# Patient Record
Sex: FEMALE | Race: WHITE | NOT HISPANIC OR LATINO | URBAN - METROPOLITAN AREA
[De-identification: names, ages, dates, MRNs, and addresses within clinical notes are randomized per-mention and may not be internally consistent; named-entity substitution may affect disease eponyms.]

---

## 2018-01-08 ENCOUNTER — OFFICE VISIT (OUTPATIENT)
Dept: OCCUPATIONAL MEDICINE | Age: 48
End: 2018-01-08

## 2018-01-08 DIAGNOSIS — Z00.8 HEALTH EXAMINATION IN POPULATION SURVEY: Primary | ICD-10-CM

## 2018-01-08 PROCEDURE — OH035 DOT/N-DOT DS COLLECTION ONLY (ALL TYPES)

## 2020-05-19 ENCOUNTER — OFFICE VISIT (OUTPATIENT)
Dept: INTERNAL MEDICINE | Facility: CLINIC | Age: 50
End: 2020-05-19
Payer: COMMERCIAL

## 2020-05-19 ENCOUNTER — HOSPITAL ENCOUNTER (OUTPATIENT)
Dept: RADIOLOGY | Facility: HOSPITAL | Age: 50
Discharge: HOME | End: 2020-05-19
Attending: FAMILY MEDICINE
Payer: COMMERCIAL

## 2020-05-19 VITALS
HEIGHT: 67 IN | SYSTOLIC BLOOD PRESSURE: 138 MMHG | BODY MASS INDEX: 25.71 KG/M2 | WEIGHT: 163.81 LBS | OXYGEN SATURATION: 97 % | TEMPERATURE: 96 F | RESPIRATION RATE: 16 BRPM | HEART RATE: 72 BPM | DIASTOLIC BLOOD PRESSURE: 72 MMHG

## 2020-05-19 DIAGNOSIS — I10 ESSENTIAL HYPERTENSION: ICD-10-CM

## 2020-05-19 DIAGNOSIS — Z11.4 ENCOUNTER FOR SCREENING FOR HIV: ICD-10-CM

## 2020-05-19 DIAGNOSIS — R07.89 CHEST DISCOMFORT: ICD-10-CM

## 2020-05-19 DIAGNOSIS — Z12.39 BREAST CANCER SCREENING: ICD-10-CM

## 2020-05-19 DIAGNOSIS — G56.03 CARPAL TUNNEL SYNDROME ON BOTH SIDES: Primary | ICD-10-CM

## 2020-05-19 DIAGNOSIS — G44.209 TENSION HEADACHE: ICD-10-CM

## 2020-05-19 PROCEDURE — 93005 ELECTROCARDIOGRAM TRACING: CPT | Mod: PO

## 2020-05-19 PROCEDURE — 3075F PR MOST RECENT SYSTOLIC BLOOD PRESS GE 130-139MM HG: ICD-10-PCS | Mod: CPTII,S$GLB,, | Performed by: FAMILY MEDICINE

## 2020-05-19 PROCEDURE — 77067 SCR MAMMO BI INCL CAD: CPT | Mod: 26,,, | Performed by: RADIOLOGY

## 2020-05-19 PROCEDURE — 77067 SCR MAMMO BI INCL CAD: CPT | Mod: TC,PO

## 2020-05-19 PROCEDURE — 77063 BREAST TOMOSYNTHESIS BI: CPT | Mod: 26,,, | Performed by: RADIOLOGY

## 2020-05-19 PROCEDURE — 3078F DIAST BP <80 MM HG: CPT | Mod: CPTII,S$GLB,, | Performed by: FAMILY MEDICINE

## 2020-05-19 PROCEDURE — 99204 PR OFFICE/OUTPT VISIT, NEW, LEVL IV, 45-59 MIN: ICD-10-PCS | Mod: S$GLB,,, | Performed by: FAMILY MEDICINE

## 2020-05-19 PROCEDURE — 99999 PR PBB SHADOW E&M-EST. PATIENT-LVL IV: ICD-10-PCS | Mod: PBBFAC,,, | Performed by: FAMILY MEDICINE

## 2020-05-19 PROCEDURE — 3008F PR BODY MASS INDEX (BMI) DOCUMENTED: ICD-10-PCS | Mod: CPTII,S$GLB,, | Performed by: FAMILY MEDICINE

## 2020-05-19 PROCEDURE — 93010 EKG 12-LEAD: ICD-10-PCS | Mod: S$GLB,,, | Performed by: INTERNAL MEDICINE

## 2020-05-19 PROCEDURE — 3075F SYST BP GE 130 - 139MM HG: CPT | Mod: CPTII,S$GLB,, | Performed by: FAMILY MEDICINE

## 2020-05-19 PROCEDURE — 93010 ELECTROCARDIOGRAM REPORT: CPT | Mod: S$GLB,,, | Performed by: INTERNAL MEDICINE

## 2020-05-19 PROCEDURE — 99999 PR PBB SHADOW E&M-EST. PATIENT-LVL IV: CPT | Mod: PBBFAC,,, | Performed by: FAMILY MEDICINE

## 2020-05-19 PROCEDURE — 77067 MAMMO DIGITAL SCREENING BILAT WITH TOMOSYNTHESIS_CAD: ICD-10-PCS | Mod: 26,,, | Performed by: RADIOLOGY

## 2020-05-19 PROCEDURE — 99204 OFFICE O/P NEW MOD 45 MIN: CPT | Mod: S$GLB,,, | Performed by: FAMILY MEDICINE

## 2020-05-19 PROCEDURE — 3008F BODY MASS INDEX DOCD: CPT | Mod: CPTII,S$GLB,, | Performed by: FAMILY MEDICINE

## 2020-05-19 PROCEDURE — 3078F PR MOST RECENT DIASTOLIC BLOOD PRESSURE < 80 MM HG: ICD-10-PCS | Mod: CPTII,S$GLB,, | Performed by: FAMILY MEDICINE

## 2020-05-19 PROCEDURE — 77063 MAMMO DIGITAL SCREENING BILAT WITH TOMOSYNTHESIS_CAD: ICD-10-PCS | Mod: 26,,, | Performed by: RADIOLOGY

## 2020-05-19 RX ORDER — LOSARTAN POTASSIUM 50 MG/1
50 TABLET ORAL DAILY
COMMUNITY
Start: 2020-04-24 | End: 2020-05-19 | Stop reason: DRUGHIGH

## 2020-05-19 RX ORDER — HYDROCHLOROTHIAZIDE 12.5 MG/1
12.5 TABLET ORAL DAILY
COMMUNITY
Start: 2020-04-24 | End: 2020-06-04 | Stop reason: DRUGHIGH

## 2020-05-19 RX ORDER — LOSARTAN POTASSIUM 100 MG/1
100 TABLET ORAL DAILY
Qty: 90 TABLET | Refills: 3 | Status: SHIPPED | OUTPATIENT
Start: 2020-05-19 | End: 2020-11-18 | Stop reason: SDUPTHER

## 2020-05-19 NOTE — PROGRESS NOTES
Patient ID: Malinda Ruiz is a 50 y.o. female.    Chief Complaint: Establish Care    HPI Patient is a 50-year-old female who presents to establish [facility].  Also reports chest pain described as sharp midsternal.  Occurs with exertion and rest only intermittent a few days a week.  No associated nausea or vomiting, no radiation to jaw or arm.  Last 3-10 minutes, usually self-resolve. Feels that the symptoms are not associated with any anxiety or stress.  Per chart check she was seen a month ago at another hospital, had lab work performed and was started on losartan and hydrochlorothiazide. Blood pressures still remain elevated 145-150/90s.     Per care everywhere from 4/28/2020:  tsh 0.78   hba1c 5.1.     Also reports history of carpal tunnel-B/L during pregnancy years ago. She lost weight then symptoms improved.  She does a lot heavy lifting at [employer] and feels like this may have caused her symptoms to flare up.  Symptoms worse at right hand. Does wear wrist splints with no relief. Reports numnbness at tips of fingers and reduced  strength.    Also reports headahces, intermittent in nature located frontal region of head and back of neck. Feels like tight band around head. No associated n/v. Sometimes light and noise aggravate headache.     History of tobacco use from [age] yrs old. 1 pk per week.  Drinks wine once or twice per week      Family History   Problem Relation Age of Onset    Hypertension Sister     Birth defects Maternal Grandfather     Heart attack Paternal Grandmother     Heart attack Paternal Grandfather     Breast cancer Maternal Aunt        Current Outpatient Medications:     hydroCHLOROthiazide (HYDRODIURIL) 12.5 MG Tab, Take 12.5 mg by mouth once daily., Disp: , Rfl:     losartan (COZAAR) 100 MG tablet, Take 1 tablet (100 mg total) by mouth once daily., Disp: 90 tablet, Rfl: 3    Review of Systems   Constitutional: Negative for chills and fever.   HENT: Negative for voice  "change.    Eyes: Negative for visual disturbance.   Respiratory: Negative for cough and shortness of breath.    Cardiovascular: Positive for chest pain and palpitations.   Gastrointestinal: Negative for abdominal pain.   Endocrine: Negative for polydipsia.   Genitourinary: Negative for pelvic pain.   Musculoskeletal: Positive for neck pain. Negative for back pain.   Skin: Negative for wound.   Neurological: Positive for headaches.   Psychiatric/Behavioral: The patient is not nervous/anxious.        Objective:   /72 (BP Location: Left arm, Patient Position: Sitting, BP Method: Medium (Automatic))   Pulse 72   Temp 96.3 °F (35.7 °C) (Oral)   Resp 16   Ht 5' 7.25" (1.708 m)   Wt 74.3 kg (163 lb 12.8 oz)   LMP 05/19/2020   SpO2 97%   BMI 25.46 kg/m²      Physical Exam   Constitutional: She is oriented to person, place, and time. She appears well-developed and well-nourished. No distress.   HENT:   Head: Normocephalic and atraumatic.   Eyes: Conjunctivae are normal.   Neck: Normal range of motion.   Posterior neck tense musculature   Cardiovascular: Normal rate, regular rhythm, normal heart sounds and intact distal pulses.   Pulmonary/Chest: Effort normal and breath sounds normal.   Abdominal: Soft.   Musculoskeletal: Normal range of motion.   Neurological: She is alert and oriented to person, place, and time. A sensory deficit (3rd 4th fingertips right hand) is present. No cranial nerve deficit.   Hand  strength 5/5 B/L   Skin: Skin is warm. She is not diaphoretic.   Psychiatric: She has a normal mood and affect. Her behavior is normal.       Assessment & Plan     Problem List Items Addressed This Visit        Neuro    Carpal tunnel syndrome on both sides - Primary    Relevant Orders    Nerve conduction test    Tension headache    Current Assessment & Plan     -headache appears to be more likely tension related. otc pain meds prn for now            Cardiac/Vascular    Essential hypertension    Overview "     -continue hctz 12.5 mg. Increased losartan to 100 mg         Relevant Medications    losartan (COZAAR) 100 MG tablet    Other Relevant Orders    Lipid Panel    Comprehensive metabolic panel       Renal/    Breast cancer screening    Current Assessment & Plan     -MMG ordered. Last mmg a few years ago. Reports no abnormal mmgs            ID    Encounter for screening for HIV    Relevant Orders    HIV 1/2 Ag/Ab (4th Gen)       Other    Chest discomfort    Current Assessment & Plan     -risk stratify with lipid panel. Recent hba1c normal         Relevant Orders    IN OFFICE EKG 12-LEAD (to [place])            Follow up if symptoms worsen or fail to improve.      Disclaimer:  This note may have been prepared using voice recognition software, without extensive proofreading. As such, there could be errors within the text such as sound alike errors.

## 2020-06-04 DIAGNOSIS — I10 ESSENTIAL HYPERTENSION: Primary | ICD-10-CM

## 2020-06-04 RX ORDER — HYDROCHLOROTHIAZIDE 25 MG/1
25 TABLET ORAL DAILY
Qty: 30 TABLET | Refills: 3 | Status: SHIPPED | OUTPATIENT
Start: 2020-06-04 | End: 2020-09-03 | Stop reason: SDUPTHER

## 2020-06-17 DIAGNOSIS — Z13.79 GENETIC SCREENING: ICD-10-CM

## 2020-08-17 ENCOUNTER — OFFICE VISIT (OUTPATIENT)
Dept: INTERNAL MEDICINE | Facility: CLINIC | Age: 50
End: 2020-08-17
Payer: COMMERCIAL

## 2020-08-17 ENCOUNTER — HOSPITAL ENCOUNTER (OUTPATIENT)
Dept: RADIOLOGY | Facility: HOSPITAL | Age: 50
Discharge: HOME | End: 2020-08-17
Attending: FAMILY MEDICINE
Payer: COMMERCIAL

## 2020-08-17 VITALS
OXYGEN SATURATION: 98 % | HEART RATE: 59 BPM | SYSTOLIC BLOOD PRESSURE: 150 MMHG | WEIGHT: 173.94 LBS | RESPIRATION RATE: 18 BRPM | TEMPERATURE: 99 F | DIASTOLIC BLOOD PRESSURE: 88 MMHG | HEIGHT: 67 IN | BODY MASS INDEX: 27.3 KG/M2

## 2020-08-17 DIAGNOSIS — M25.552 PAIN OF LEFT HIP JOINT: ICD-10-CM

## 2020-08-17 DIAGNOSIS — G56.03 CARPAL TUNNEL SYNDROME ON BOTH SIDES: ICD-10-CM

## 2020-08-17 DIAGNOSIS — I10 ESSENTIAL HYPERTENSION: Primary | ICD-10-CM

## 2020-08-17 DIAGNOSIS — R07.89 CHEST DISCOMFORT: ICD-10-CM

## 2020-08-17 PROCEDURE — 3008F BODY MASS INDEX DOCD: CPT | Mod: CPTII,S$GLB,, | Performed by: FAMILY MEDICINE

## 2020-08-17 PROCEDURE — 99999 PR PBB SHADOW E&M-EST. PATIENT-LVL IV: ICD-10-PCS | Mod: PBBFAC,,, | Performed by: FAMILY MEDICINE

## 2020-08-17 PROCEDURE — 3079F DIAST BP 80-89 MM HG: CPT | Mod: CPTII,S$GLB,, | Performed by: FAMILY MEDICINE

## 2020-08-17 PROCEDURE — 3079F PR MOST RECENT DIASTOLIC BLOOD PRESSURE 80-89 MM HG: ICD-10-PCS | Mod: CPTII,S$GLB,, | Performed by: FAMILY MEDICINE

## 2020-08-17 PROCEDURE — 99214 OFFICE O/P EST MOD 30 MIN: CPT | Mod: S$GLB,,, | Performed by: FAMILY MEDICINE

## 2020-08-17 PROCEDURE — 3008F PR BODY MASS INDEX (BMI) DOCUMENTED: ICD-10-PCS | Mod: CPTII,S$GLB,, | Performed by: FAMILY MEDICINE

## 2020-08-17 PROCEDURE — 3077F PR MOST RECENT SYSTOLIC BLOOD PRESSURE >= 140 MM HG: ICD-10-PCS | Mod: CPTII,S$GLB,, | Performed by: FAMILY MEDICINE

## 2020-08-17 PROCEDURE — 73502 XR HIP 2 VIEW LEFT: ICD-10-PCS | Mod: 26,LT,, | Performed by: RADIOLOGY

## 2020-08-17 PROCEDURE — 99214 PR OFFICE/OUTPT VISIT, EST, LEVL IV, 30-39 MIN: ICD-10-PCS | Mod: S$GLB,,, | Performed by: FAMILY MEDICINE

## 2020-08-17 PROCEDURE — 99999 PR PBB SHADOW E&M-EST. PATIENT-LVL IV: CPT | Mod: PBBFAC,,, | Performed by: FAMILY MEDICINE

## 2020-08-17 PROCEDURE — 3077F SYST BP >= 140 MM HG: CPT | Mod: CPTII,S$GLB,, | Performed by: FAMILY MEDICINE

## 2020-08-17 PROCEDURE — 73502 X-RAY EXAM HIP UNI 2-3 VIEWS: CPT | Mod: 26,LT,, | Performed by: RADIOLOGY

## 2020-08-17 PROCEDURE — 73502 X-RAY EXAM HIP UNI 2-3 VIEWS: CPT | Mod: TC,PO,LT

## 2020-08-17 RX ORDER — NAPROXEN SODIUM 220 MG
220 TABLET ORAL EVERY OTHER DAY
COMMUNITY

## 2020-08-17 RX ORDER — AMLODIPINE BESYLATE 5 MG/1
5 TABLET ORAL DAILY
Qty: 30 TABLET | Refills: 9 | Status: SHIPPED | OUTPATIENT
Start: 2020-08-17 | End: 2020-09-15 | Stop reason: SDUPTHER

## 2020-08-17 NOTE — PROGRESS NOTES
Patient ID: Malinda Ruiz is a 50 y.o. female.    Chief Complaint: Carpal tunnel pain, Hip Pain, and High BP past few days    HPI 51 yo F with elevated home bps (140-150s/90s).  She reports compliance with hydrochlorothiazide and losartan.  Also says that carpal tunnel syndrome has been worse. Left hand pain worse.  Wears wrist splints at night however sometimes splints help sometimes they do not.  Reports numbness of b/l fingers.  Also bilateral hand weakness.  At her job she works with her hands a lot.  Had this complaint at last appointment.  Nerve conduction study test ordered but never performed.      Still reports occasional chest discomfort (nost recently 2 weeks ago that lasted 30 minutes).  At last appointment EKG was normal. Will refer to cardiology.  Dad  of MI at 71 yo.    Also reports constant left hip pain that began over 2 months ago.  No known inciting event.  Associated symptoms include stiffness and difficulty walking 2/2 pain.  No swelling or erythema.  No aggravating or alleviating factors. For hip and carpal tunnel pain she tries to avoid nsaids to avoid elevating blood pressure.    Family History   Problem Relation Age of Onset    Hypertension Sister     Birth defects Maternal Grandfather     Heart attack Paternal Grandmother     Heart attack Paternal Grandfather     Breast cancer Maternal Aunt        Current Outpatient Medications:     hydroCHLOROthiazide (HYDRODIURIL) 25 MG tablet, Take 1 tablet (25 mg total) by mouth once daily., Disp: 30 tablet, Rfl: 3    losartan (COZAAR) 100 MG tablet, Take 1 tablet (100 mg total) by mouth once daily., Disp: 90 tablet, Rfl: 3    naproxen sodium (ALEVE) 220 MG tablet, Take 220 mg by mouth every other day., Disp: , Rfl:     amLODIPine (NORVASC) 5 MG tablet, Take 1 tablet (5 mg total) by mouth once daily., Disp: 30 tablet, Rfl: 9    Review of Systems   Constitutional: Negative for chills and fever.   HENT: Negative for congestion  "and sore throat.    Eyes: Negative for visual disturbance.   Respiratory: Negative for cough, shortness of breath and wheezing.    Cardiovascular: Negative for chest pain.   Gastrointestinal: Negative for abdominal pain, diarrhea and nausea.   Endocrine: Negative for polydipsia and polyuria.   Musculoskeletal: Positive for arthralgias.        Hip, hands   Skin: Negative for rash.   Neurological: Positive for weakness and numbness. Negative for dizziness and headaches.   Psychiatric/Behavioral: Negative for sleep disturbance. The patient is not nervous/anxious.        Objective:   BP (!) 150/88 (BP Location: Left arm, Patient Position: Sitting, BP Method: Large (Manual))   Pulse (!) 59   Temp 99 °F (37.2 °C) (Temporal)   Resp 18   Ht 5' 7.25" (1.708 m)   Wt 78.9 kg (173 lb 15.1 oz)   LMP 05/26/2020   SpO2 98%   BMI 27.04 kg/m²      Physical Exam  Constitutional:       Appearance: She is well-developed.   HENT:      Head: Normocephalic and atraumatic.   Eyes:      Conjunctiva/sclera: Conjunctivae normal.   Neck:      Musculoskeletal: Normal range of motion.   Cardiovascular:      Rate and Rhythm: Normal rate and regular rhythm.      Heart sounds: Normal heart sounds.   Pulmonary:      Effort: Pulmonary effort is normal.      Breath sounds: Normal breath sounds.   Abdominal:      Palpations: Abdomen is soft.   Musculoskeletal: Normal range of motion.         General: Tenderness (left hip worse with internal rotation) present. No swelling, deformity or signs of injury.   Skin:     General: Skin is warm.   Neurological:      General: No focal deficit present.      Mental Status: She is alert and oriented to person, place, and time.      Comments: Normal b/l hand . negative phalen test   Psychiatric:         Behavior: Behavior normal.         Assessment & Plan     Problem List Items Addressed This Visit        Neuro    Carpal tunnel syndrome on both sides    Current Assessment & Plan     -schedule nerve " conduction test            Cardiac/Vascular    Essential hypertension - Primary    Current Assessment & Plan     -continue hctz 25 mg and Losartan 100 mg. Start amlodipine 5 mg  -will also refer to cards for eval of intermittent chest discomfort and further management of HTN         Relevant Medications    amLODIPine (NORVASC) 5 MG tablet    Other Relevant Orders    Ambulatory referral/consult to Cardiology      Other Visit Diagnoses     Pain of left hip joint        Relevant Orders    X-Ray Hip 2 View Left (Completed)            Follow up in about 2 weeks (around 8/31/2020) for Recheck blood pressure.      Disclaimer:  This note may have been prepared using voice recognition software, without extensive proofreading. As such, there could be errors within the text such as sound alike errors.

## 2020-08-17 NOTE — ASSESSMENT & PLAN NOTE
-continue hctz 25 mg and Losartan 100 mg. Start amlodipine 5 mg  -will also refer to cards for eval of intermittent chest discomfort and further management of HTN

## 2020-08-18 ENCOUNTER — TELEPHONE (OUTPATIENT)
Dept: ORTHOPEDICS | Facility: CLINIC | Age: 50
End: 2020-08-18

## 2020-08-18 ENCOUNTER — TELEPHONE (OUTPATIENT)
Dept: INTERNAL MEDICINE | Facility: CLINIC | Age: 50
End: 2020-08-18

## 2020-08-18 DIAGNOSIS — M16.12 ARTHRITIS OF LEFT HIP: Primary | ICD-10-CM

## 2020-08-18 NOTE — TELEPHONE ENCOUNTER
----- Message from Ramona Hurtado MD sent at 8/18/2020  7:43 AM CDT -----  Please notify patient that left hip xray showed bilateral hip degenerative findings.  Also showed degenerative changes of the lower lumbar spine. Would like to refer to ortho for further management since pain has been persistent, if she is agreeable

## 2020-08-21 ENCOUNTER — LAB VISIT (OUTPATIENT)
Dept: LAB | Facility: HOSPITAL | Age: 50
End: 2020-08-21
Attending: INTERNAL MEDICINE
Payer: COMMERCIAL

## 2020-08-21 ENCOUNTER — OFFICE VISIT (OUTPATIENT)
Dept: CARDIOLOGY | Facility: CLINIC | Age: 50
End: 2020-08-21
Payer: COMMERCIAL

## 2020-08-21 VITALS
WEIGHT: 170 LBS | DIASTOLIC BLOOD PRESSURE: 90 MMHG | SYSTOLIC BLOOD PRESSURE: 148 MMHG | BODY MASS INDEX: 26.42 KG/M2 | HEART RATE: 56 BPM | OXYGEN SATURATION: 98 %

## 2020-08-21 DIAGNOSIS — I10 ESSENTIAL HYPERTENSION: ICD-10-CM

## 2020-08-21 DIAGNOSIS — R07.89 OTHER CHEST PAIN: Primary | ICD-10-CM

## 2020-08-21 DIAGNOSIS — R06.02 SOB (SHORTNESS OF BREATH): ICD-10-CM

## 2020-08-21 DIAGNOSIS — R06.83 SNORES: ICD-10-CM

## 2020-08-21 PROCEDURE — 99999 PR PBB SHADOW E&M-EST. PATIENT-LVL IV: ICD-10-PCS | Mod: PBBFAC,,, | Performed by: INTERNAL MEDICINE

## 2020-08-21 PROCEDURE — 36415 COLL VENOUS BLD VENIPUNCTURE: CPT

## 2020-08-21 PROCEDURE — 82088 ASSAY OF ALDOSTERONE: CPT

## 2020-08-21 PROCEDURE — 99999 PR PBB SHADOW E&M-EST. PATIENT-LVL IV: CPT | Mod: PBBFAC,,, | Performed by: INTERNAL MEDICINE

## 2020-08-21 PROCEDURE — 99245 OFF/OP CONSLTJ NEW/EST HI 55: CPT | Mod: S$GLB,,, | Performed by: INTERNAL MEDICINE

## 2020-08-21 PROCEDURE — 83835 ASSAY OF METANEPHRINES: CPT

## 2020-08-21 PROCEDURE — 99245 PR OFFICE CONSULTATION,LEVEL V: ICD-10-PCS | Mod: S$GLB,,, | Performed by: INTERNAL MEDICINE

## 2020-08-21 NOTE — PROGRESS NOTES
Subjective:   Patient ID:  Malinda Ruiz is a 50 y.o. female who presents for evaluation of Dizziness and Chest Pain      49yo female, referred for HTN and chest pain  PMH HTN  Started Losartan and HCXT few months ago and added Amlodipine 3 days ago.  Pt is faithfully taking her med. BP at 140 to 150 mmHG  C/o chest pain for 2 weeks, dyspnea after wearing the mask, dizziness, faint,  Some hand shaking,   Dizziness recently.  No vision change  Walking only due to knee injury  Lifting work at [employer]  No smoking  Occasional drinking  Father had heart issue at 72. Mother viral infection  EKG  NSR      Past Medical History:   Diagnosis Date    Hypertension        Past Surgical History:   Procedure Laterality Date     SECTION      KNEE SURGERY Bilateral        Social History     Tobacco Use    Smoking status: Never Smoker    Smokeless tobacco: Never Used   Substance Use Topics    Alcohol use: Yes     Frequency: 2-3 times a week     Drinks per session: 1 or 2     Binge frequency: Never    Drug use: Not on file       Family History   Problem Relation Age of Onset    Hypertension Sister     Birth defects Maternal Grandfather     Heart attack Paternal Grandmother     Heart attack Paternal Grandfather     Breast cancer Maternal Aunt        Review of Systems   Constitution: Negative for decreased appetite, diaphoresis, fever, malaise/fatigue and night sweats.   HENT: Negative for nosebleeds.    Eyes: Negative for blurred vision and double vision.   Cardiovascular: Positive for chest pain and dyspnea on exertion. Negative for claudication, irregular heartbeat, leg swelling, near-syncope, orthopnea, palpitations, paroxysmal nocturnal dyspnea and syncope.   Respiratory: Negative for cough, shortness of breath, sleep disturbances due to breathing, snoring, sputum production and wheezing.    Endocrine: Negative for cold intolerance and polyuria.   Hematologic/Lymphatic: Does not  bruise/bleed easily.   Skin: Negative for rash.   Musculoskeletal: Negative for back pain, falls, joint pain, joint swelling and neck pain.   Gastrointestinal: Negative for abdominal pain, heartburn, nausea and vomiting.   Genitourinary: Negative for dysuria, frequency and hematuria.   Neurological: Positive for dizziness and light-headedness. Negative for difficulty with concentration, focal weakness, headaches, numbness, seizures and weakness.   Psychiatric/Behavioral: Negative for depression, memory loss and substance abuse. The patient does not have insomnia.    Allergic/Immunologic: Negative for HIV exposure and hives.       Objective:   Physical Exam   Constitutional: She is oriented to person, place, and time. She appears well-nourished.   HENT:   Head: Normocephalic.   Eyes: Pupils are equal, round, and reactive to light.   Neck: Normal carotid pulses and no JVD present. Carotid bruit is not present. No thyromegaly present.   Cardiovascular: Normal rate, regular rhythm, normal heart sounds and normal pulses.  No extrasystoles are present. PMI is not displaced. Exam reveals no gallop and no S3.   No murmur heard.  Pulmonary/Chest: Breath sounds normal. No stridor. No respiratory distress.   Abdominal: Soft. Bowel sounds are normal. There is no abdominal tenderness. There is no rebound.   Musculoskeletal: Normal range of motion.   Neurological: She is alert and oriented to person, place, and time.   Skin: Skin is intact. No rash noted.   Psychiatric: Her behavior is normal.       Lab Results   Component Value Date    CHOL 180 05/19/2020     Lab Results   Component Value Date    HDL 81 (H) 05/19/2020     Lab Results   Component Value Date    LDLCALC 80.2 05/19/2020     Lab Results   Component Value Date    TRIG 94 05/19/2020     Lab Results   Component Value Date    CHOLHDL 45.0 05/19/2020       Chemistry        Component Value Date/Time     05/19/2020 1447    K 3.6 05/19/2020 1447     05/19/2020  1447    CO2 27 05/19/2020 1447    BUN 19 05/19/2020 1447    CREATININE 0.8 05/19/2020 1447     05/19/2020 1447        Component Value Date/Time    CALCIUM 9.2 05/19/2020 1447    ALKPHOS 52 (L) 05/19/2020 1447    AST 15 05/19/2020 1447    ALT 16 05/19/2020 1447    BILITOT 0.6 05/19/2020 1447    ESTGFRAFRICA >60.0 05/19/2020 1447    EGFRNONAA >60.0 05/19/2020 1447          No results found for: LABA1C, HGBA1C  No results found for: TSH  No results found for: INR, PROTIME  No results found for: WBC, HGB, HCT, MCV, PLT  BNP  @LABRCNTIP(BNP,BNPTRIAGEBLO)@  CrCl cannot be calculated (Patient's most recent lab result is older than the maximum 7 days allowed.).  No results found in the last 24 hours.  No results found in the last 24 hours.  No results found in the last 24 hours.    Assessment:      1. Other chest pain    2. Essential hypertension    3. SOB (shortness of breath)    4. Snores        Plan:   Continue Losartan  HCTZ and amlodipine   Check BP   Echo and ETT for CP  Renin/metaepheirne check  Refer to sleep study  DM Rx per PCP  Counseled DASH  Check Lipid profile in 6 months  Recommend heart-healthy diet, weight control and regular exercise.  Brenda. Risk modification.   F/u with pcp    I have reviewed all pertinent labs and cardiac studies independently. Plans and recommendations have been formulated under my direct supervision. All questions answered and patient voiced understanding.   If symptoms persist go to the ED

## 2020-08-21 NOTE — LETTER
August 22, 2020      Ramona Hurtado MD  [address] [address] [address]           [facility]  [address] [address]  Phone: [phone]  Fax: [phone]          Patient: Malinda Ruiz   MR Number: [id number]   YOB: 1970   Date of Visit: 8/21/2020       Dear Dr. Ramona Hurtado:    Thank you for referring Malinda Ruiz to me for evaluation. Attached you will find relevant portions of my assessment and plan of care.    If you have questions, please do not hesitate to call me. I look forward to following Malinda Ruiz along with you.    Sincerely,    Troy Velasquez MD    Enclosure  CC:  No Recipients    If you would like to receive this communication electronically, please contact [email or web address] or [phone] to request more information on [employer] access.    For providers and/or their staff who would like to refer a patient to [facility], please contact us through our one-stop-shop provider referral line, [facility] at [phone].    If you feel you have received this communication in error or would no longer like to receive these types of communications, please e-mail [email or web address]

## 2020-08-25 LAB
ALDOST SERPL-MCNC: 8 NG/DL
ALDOST/RENIN PLAS-RTO: 16 RATIO
RENIN PLAS-CCNC: 0.5 NG/ML/HR

## 2020-08-27 LAB
METANEPH FREE SERPL-MCNC: <25 PG/ML
METANEPHS SERPL-MCNC: 51 PG/ML
NORMETANEPH FREE SERPL-MCNC: 51 PG/ML

## 2020-08-28 ENCOUNTER — TELEPHONE (OUTPATIENT)
Dept: RADIOLOGY | Facility: HOSPITAL | Age: 50
End: 2020-08-28

## 2020-08-28 ENCOUNTER — TELEPHONE (OUTPATIENT)
Dept: CARDIOLOGY | Facility: CLINIC | Age: 50
End: 2020-08-28

## 2020-08-28 NOTE — TELEPHONE ENCOUNTER
Pt contacted about results, pt verbalized understanding          ----- Message from Troy Velasquez MD sent at 8/28/2020  2:55 PM CDT -----  Lab showed no 2nd HTN

## 2020-08-31 ENCOUNTER — HOSPITAL ENCOUNTER (OUTPATIENT)
Dept: RADIOLOGY | Facility: HOSPITAL | Age: 50
Discharge: HOME | End: 2020-08-31
Attending: INTERNAL MEDICINE
Payer: COMMERCIAL

## 2020-08-31 DIAGNOSIS — I10 ESSENTIAL HYPERTENSION: ICD-10-CM

## 2020-08-31 PROCEDURE — 93975 US RENAL ARTERY STENOSIS HYPERTEN (XPD): ICD-10-PCS | Mod: 26,,, | Performed by: RADIOLOGY

## 2020-08-31 PROCEDURE — 93975 VASCULAR STUDY: CPT | Mod: 26,,, | Performed by: RADIOLOGY

## 2020-08-31 PROCEDURE — 93975 VASCULAR STUDY: CPT | Mod: TC,PO

## 2020-09-08 ENCOUNTER — TELEPHONE (OUTPATIENT)
Dept: CARDIOLOGY | Facility: CLINIC | Age: 50
End: 2020-09-08

## 2020-09-08 NOTE — TELEPHONE ENCOUNTER
Patient contacted and was advised that her Renal US showed left renal cyst. Patient stated understanding with no questions or concerns    ----- Message from Troy Velasquez MD sent at 9/7/2020 10:42 PM CDT -----  NO EXTRA Rx NOW  F/U WITH PCP  ----- Message -----  From: Nathalie Ramirez MA  Sent: 9/3/2020  12:09 PM CDT  To: Troy Velasquez MD    Please advise on what she should do next. The treatment for the cyst?  ----- Message -----  From: Troy Velasquez MD  Sent: 8/31/2020  11:01 PM CDT  To: [facility] Staff    Renal US showed left renal cyst

## 2020-09-09 ENCOUNTER — OFFICE VISIT (OUTPATIENT)
Dept: ORTHOPEDICS | Facility: CLINIC | Age: 50
End: 2020-09-09
Payer: COMMERCIAL

## 2020-09-09 ENCOUNTER — OFFICE VISIT (OUTPATIENT)
Dept: PHYSICAL MEDICINE AND REHAB | Facility: CLINIC | Age: 50
End: 2020-09-09
Payer: COMMERCIAL

## 2020-09-09 ENCOUNTER — OFFICE VISIT (OUTPATIENT)
Dept: PULMONOLOGY | Facility: CLINIC | Age: 50
End: 2020-09-09
Payer: COMMERCIAL

## 2020-09-09 VITALS
SYSTOLIC BLOOD PRESSURE: 135 MMHG | BODY MASS INDEX: 26.53 KG/M2 | HEIGHT: 67 IN | DIASTOLIC BLOOD PRESSURE: 82 MMHG | WEIGHT: 169 LBS | HEART RATE: 68 BPM

## 2020-09-09 VITALS
BODY MASS INDEX: 26.53 KG/M2 | HEIGHT: 67 IN | RESPIRATION RATE: 14 BRPM | SYSTOLIC BLOOD PRESSURE: 136 MMHG | HEART RATE: 69 BPM | DIASTOLIC BLOOD PRESSURE: 79 MMHG | WEIGHT: 169 LBS

## 2020-09-09 VITALS
HEIGHT: 67 IN | OXYGEN SATURATION: 98 % | RESPIRATION RATE: 16 BRPM | DIASTOLIC BLOOD PRESSURE: 82 MMHG | SYSTOLIC BLOOD PRESSURE: 126 MMHG | BODY MASS INDEX: 28.34 KG/M2 | HEART RATE: 82 BPM | WEIGHT: 180.56 LBS

## 2020-09-09 DIAGNOSIS — G47.8 NON-RESTORATIVE SLEEP: ICD-10-CM

## 2020-09-09 DIAGNOSIS — M25.552 GREATER TROCHANTERIC PAIN SYNDROME OF LEFT LOWER EXTREMITY: Primary | ICD-10-CM

## 2020-09-09 DIAGNOSIS — I10 ESSENTIAL HYPERTENSION: ICD-10-CM

## 2020-09-09 DIAGNOSIS — R29.898 WEAKNESS OF LEFT HIP: ICD-10-CM

## 2020-09-09 DIAGNOSIS — G56.03 CARPAL TUNNEL SYNDROME ON BOTH SIDES: ICD-10-CM

## 2020-09-09 DIAGNOSIS — R29.818 SUSPECTED SLEEP APNEA: Primary | ICD-10-CM

## 2020-09-09 DIAGNOSIS — R07.9 CHEST PAIN, UNSPECIFIED TYPE: ICD-10-CM

## 2020-09-09 DIAGNOSIS — M16.12 ARTHRITIS OF LEFT HIP: ICD-10-CM

## 2020-09-09 DIAGNOSIS — R06.83 SNORES: ICD-10-CM

## 2020-09-09 PROBLEM — I16.0 HYPERTENSIVE URGENCY: Status: ACTIVE | Noted: 2020-04-24

## 2020-09-09 PROCEDURE — 99204 PR OFFICE/OUTPT VISIT, NEW, LEVL IV, 45-59 MIN: ICD-10-PCS | Mod: 25,S$GLB,, | Performed by: PHYSICAL MEDICINE & REHABILITATION

## 2020-09-09 PROCEDURE — 99999 PR PBB SHADOW E&M-EST. PATIENT-LVL IV: ICD-10-PCS | Mod: PBBFAC,,, | Performed by: PHYSICAL MEDICINE & REHABILITATION

## 2020-09-09 PROCEDURE — 3074F SYST BP LT 130 MM HG: CPT | Mod: CPTII,S$GLB,, | Performed by: INTERNAL MEDICINE

## 2020-09-09 PROCEDURE — 3079F DIAST BP 80-89 MM HG: CPT | Mod: CPTII,S$GLB,, | Performed by: INTERNAL MEDICINE

## 2020-09-09 PROCEDURE — 3079F PR MOST RECENT DIASTOLIC BLOOD PRESSURE 80-89 MM HG: ICD-10-PCS | Mod: CPTII,S$GLB,, | Performed by: INTERNAL MEDICINE

## 2020-09-09 PROCEDURE — 3008F PR BODY MASS INDEX (BMI) DOCUMENTED: ICD-10-PCS | Mod: CPTII,S$GLB,, | Performed by: PHYSICAL MEDICINE & REHABILITATION

## 2020-09-09 PROCEDURE — 3079F DIAST BP 80-89 MM HG: CPT | Mod: CPTII,S$GLB,, | Performed by: PHYSICAL MEDICINE & REHABILITATION

## 2020-09-09 PROCEDURE — 99204 PR OFFICE/OUTPT VISIT, NEW, LEVL IV, 45-59 MIN: ICD-10-PCS | Mod: S$GLB,,, | Performed by: INTERNAL MEDICINE

## 2020-09-09 PROCEDURE — 3008F BODY MASS INDEX DOCD: CPT | Mod: CPTII,S$GLB,, | Performed by: INTERNAL MEDICINE

## 2020-09-09 PROCEDURE — 3075F SYST BP GE 130 - 139MM HG: CPT | Mod: CPTII,S$GLB,, | Performed by: PHYSICAL MEDICINE & REHABILITATION

## 2020-09-09 PROCEDURE — 99204 PR OFFICE/OUTPT VISIT, NEW, LEVL IV, 45-59 MIN: ICD-10-PCS | Mod: S$GLB,,, | Performed by: PHYSICAL MEDICINE & REHABILITATION

## 2020-09-09 PROCEDURE — 3078F PR MOST RECENT DIASTOLIC BLOOD PRESSURE < 80 MM HG: ICD-10-PCS | Mod: CPTII,S$GLB,, | Performed by: PHYSICAL MEDICINE & REHABILITATION

## 2020-09-09 PROCEDURE — 3075F PR MOST RECENT SYSTOLIC BLOOD PRESS GE 130-139MM HG: ICD-10-PCS | Mod: CPTII,S$GLB,, | Performed by: PHYSICAL MEDICINE & REHABILITATION

## 2020-09-09 PROCEDURE — 3008F PR BODY MASS INDEX (BMI) DOCUMENTED: ICD-10-PCS | Mod: CPTII,S$GLB,, | Performed by: INTERNAL MEDICINE

## 2020-09-09 PROCEDURE — 99999 PR PBB SHADOW E&M-EST. PATIENT-LVL IV: CPT | Mod: PBBFAC,,, | Performed by: INTERNAL MEDICINE

## 2020-09-09 PROCEDURE — 95911 NRV CNDJ TEST 9-10 STUDIES: CPT | Mod: S$GLB,,, | Performed by: PHYSICAL MEDICINE & REHABILITATION

## 2020-09-09 PROCEDURE — 3008F BODY MASS INDEX DOCD: CPT | Mod: CPTII,S$GLB,, | Performed by: PHYSICAL MEDICINE & REHABILITATION

## 2020-09-09 PROCEDURE — 99204 OFFICE O/P NEW MOD 45 MIN: CPT | Mod: 25,S$GLB,, | Performed by: PHYSICAL MEDICINE & REHABILITATION

## 2020-09-09 PROCEDURE — 99204 OFFICE O/P NEW MOD 45 MIN: CPT | Mod: S$GLB,,, | Performed by: INTERNAL MEDICINE

## 2020-09-09 PROCEDURE — 3079F PR MOST RECENT DIASTOLIC BLOOD PRESSURE 80-89 MM HG: ICD-10-PCS | Mod: CPTII,S$GLB,, | Performed by: PHYSICAL MEDICINE & REHABILITATION

## 2020-09-09 PROCEDURE — 95911 PR NERVE CONDUCTION STUDY; 9-10 STUDIES: ICD-10-PCS | Mod: S$GLB,,, | Performed by: PHYSICAL MEDICINE & REHABILITATION

## 2020-09-09 PROCEDURE — 3078F DIAST BP <80 MM HG: CPT | Mod: CPTII,S$GLB,, | Performed by: PHYSICAL MEDICINE & REHABILITATION

## 2020-09-09 PROCEDURE — 99999 PR PBB SHADOW E&M-EST. PATIENT-LVL IV: ICD-10-PCS | Mod: PBBFAC,,, | Performed by: INTERNAL MEDICINE

## 2020-09-09 PROCEDURE — 3074F PR MOST RECENT SYSTOLIC BLOOD PRESSURE < 130 MM HG: ICD-10-PCS | Mod: CPTII,S$GLB,, | Performed by: INTERNAL MEDICINE

## 2020-09-09 PROCEDURE — 99204 OFFICE O/P NEW MOD 45 MIN: CPT | Mod: S$GLB,,, | Performed by: PHYSICAL MEDICINE & REHABILITATION

## 2020-09-09 PROCEDURE — 99999 PR PBB SHADOW E&M-EST. PATIENT-LVL IV: CPT | Mod: PBBFAC,,, | Performed by: PHYSICAL MEDICINE & REHABILITATION

## 2020-09-09 NOTE — PROGRESS NOTES
SPORTS MEDICINE / PM&R New Patient Visit :    Referring Physician: Ramona Hurtado MD    Chief Complaint   Patient presents with    Left Hip - Pain       HPI: This is a 50 y.o.  female being seen in clinic today for evaluation of Pain of the Left Hip   The problem first began about two months ago. She feels sharp, aching, numbness, tingling, constant and pain at rest pain around her left hip - mostly on lateral aspect. Has noticed clicking on lateral hip as well, but feels it into her low back at night. Hurts to lay on left side. The symptoms are worsening. She has tried aleve without improvement. She has not tried therapy. 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 Has been taking aleve PRN. Had x-rays that show pretty minimal degenerative changes.    History obtained from patient.    Past family, medical, social, surgical history, and vital signs reviewed in chart.    Review of Systems   Constitutional: Negative for chills, fever and weight loss.   HENT: Negative for hearing loss and sore throat.    Eyes: Negative for blurred vision, photophobia and pain.   Respiratory: Negative for shortness of breath.    Cardiovascular: Negative for chest pain.   Gastrointestinal: Negative for abdominal pain.   Genitourinary: Negative for dysuria.   Skin: Negative for rash.   Neurological: Positive for tingling. Negative for headaches.   Endo/Heme/Allergies: Does not bruise/bleed easily.   Psychiatric/Behavioral: Negative for depression.       General    Nursing note and vitals reviewed.  Constitutional: She is oriented to person, place, and time. She appears well-developed and well-nourished.   HENT:   Head: Normocephalic and atraumatic.   Eyes: Conjunctivae and EOM are normal. Pupils are equal, round, and reactive to light.   Neck: Neck supple.   Cardiovascular: Intact distal pulses.    Pulmonary/Chest: Effort normal. No respiratory distress.   Abdominal: She exhibits no distension.   Neurological: She is alert and  oriented to person, place, and time. She has normal reflexes.   Psychiatric: She has a normal mood and affect.     General Musculoskeletal Exam   Gait: Trendelenburg   Pelvic Obliquity: none        Right Hip Exam     Inspection   Swelling: absent  Bruising: absent  No deformity of hip.  Erythema: absent    Range of Motion   Abduction: normal   Adduction: normal   Extension: abnormal   Flexion: normal   External rotation: normal   Internal rotation: normal     Tests   Pain w/ forced internal rotation (YOVANA): absent  Pain w/ forced external rotation (FADIR): absent  Trendelenburg Test: level  Circumduction test: negative  Log Roll: negative  Snapping Hip (internal): negative    Other   Sensation: normal  Left Hip Exam     Inspection   Swelling: absent  No deformity of hip.  Erythema: absent  Bruising: absent    Tenderness   The patient tender to palpation of the trochanteric bursa and piriformis.    Range of Motion   Abduction: normal   Adduction: normal   Extension: abnormal   Flexion: normal   External rotation: normal   Internal rotation: normal     Tests   Pain w/ forced internal rotation (YOVANA): present (lateral pain with this)  Pain w/ forced external rotation (FADIR): absent  Sean: positive  Trendelenburg Test: level  Circumduction test: negative  Log Roll: negative  Snapping Hip (internal): negative    Other   Sensation: normal    Comments:  All pain localizes to superior aspect of greater trochanter      Back (L-Spine & T-Spine) / Neck (C-Spine) Exam   Back exam is normal.      Muscle Strength   Right Lower Extremity   Hip Abduction: 4/5   Hip Adduction: 5/5   Hip Flexion: 5/5   Left Lower Extremity   Hip Abduction: 3/5   Hip Adduction: 4/5   Hip Flexion: 5/5     Reflexes     Left Side  Achilles:  2+  Quadriceps:  2+    Right Side   Achilles:  2+  Quadriceps:  2+    Vascular Exam     Right Pulses  Dorsalis Pedis:      2+          Left Pulses  Dorsalis Pedis:      2+                IMPRESSION/PLAN: This is a  50 y.o.  female with:    Greater trochanteric pain syndrome of left lower extremity  -     Ambulatory referral/consult to Physical/Occupational Therapy; Future; Expected date: 09/16/2020    Arthritis of left hip  -     Ambulatory referral/consult to Orthopedics  -     Ambulatory referral/consult to Physical/Occupational Therapy; Future; Expected date: 09/16/2020    Weakness of left hip  -     Ambulatory referral/consult to Physical/Occupational Therapy; Future; Expected date: 09/16/2020        The findings were discussed with Malinda in detail. We reviewed her relatively benign appearing x-rays and that area of her pain isn't usually where hip OA pain is felt. This seems much more likely soft tissue related from gluteus medius / minimus weakness with possible component of bursitis. Advised against steroid shots for now. Would do NSAIDS, but dealing with HTN issues currently. She'll continue to use those sparingly. Most important thing will be physical therapy to work on progressive hip abduction and extension strengthening.  She was provided with this plan in writing. All of her questions were answered. She will follow up with me in 4 - 6 weeks.     Tsering Tapia M.D.  Sports Medicine

## 2020-09-09 NOTE — PATIENT INSTRUCTIONS
No eating / drinking for 3 hours before going to bed.  Elevate head of bed 30 - 45 %     CPAP HABITUATION PROCEDURE     Trae Bird, Ph.D., [facility] and Bunny Connor M.D.  [facility], [facility]     Some people have difficulty adjusting to CPAP/BiPAP/AutoCPAP.  This is not unusual or hard to understand: Breathing with CPAP is different from ordinary breathing, and this difference is aversive to some. The problem can be overcome, however, and the benefits CPAP confers are certainly worth the effort.  Below, you will find a simple and gradual way to get used to CPAP before you try to use it all night, every night.  The essence of this procedure is to relax and let breathing with CPAP become a habit.  It may take about 2 weeks, and involves the followin. CPAP while awake and comfortably seated, during the late evening.     2. CPAP in bed while attempting sleep at night.     3. If your discomfort is too great at any time, discontinue and attempt again later the same night, for the same amount of time.   4. You and your physician may alter the times and pressures if necessary.     5. If you find that it is very easy to get used to CPAP, you may start using it every night when you are comfortable enough to do so.  6. IMPORTANT REMINDER: If you have a cold or sinus congestion it is okay to miss a night or two of CPAP. Consider using antihistamines or decongestants to clear up your sinus congestion prior to sleeping.     DAYS  1-3   Start CPAP while awake and comfortably seated during the late evening, after having prepared for bed.  You may do this while watching television, listening to music or reading. Use for 1 hour, then take off CPAP and go directly to bed to sleep     DAYS  4-6     Start CPAP when you go to bed and use for 1 hour, or until you fall asleep.  If your discomfort is too great at any time, discontinue and attempt again later the same night, for the  same designated amount of time (1 hour).      DAYS  7-9     Increase time with CPAP to 2 hours a night.  If your discomfort is too great at any time, discontinue and attempt again later the same night, for the same designated amount of time (2 hours).      DAYS 10-12    Increase time with CPAP to 3 hours a night. If your discomfort is too great at any time, discontinue and attempt again later the same night, for the same designated amount of time (3 hours).      DAYS 13-15     Sleep the entire night with CPAP.      OPTIONAL: You may use Progressive Muscle Relaxation (PMR) to help put you at ease when using CPAP; do PMR twice each day, once in the morning or afternoon, and once in the evening just before using CPAP. You may do PMR prior to any attempt until you are comfortable with CPAP.        Continuous Positive Air Pressure (CPAP)  Continuous positive air pressure (CPAP) uses gentle air pressure to hold the airway open. CPAP is often the most effective treatment for sleep apnea and severe snoring. It works very well for many people. But keep in mind that it can take several adjustments before the setup is right for you.       How CPAP Works  CPAP is a small portable pump beside the bed. The pump sends air through a hose, which is held over your nose and/or mouth by a mask. Mild air pressure is gently pushed through your airway. The air pressure nudges sagging tissues aside. This widens the airway so you can breathe better. CPAP may be combined with other kinds of therapy for sleep apnea.       Types of Air Pressure Treatments  There are different types of CPAP. Your doctor or CPAP technician will help you decide which type is best for you:  · Basic CPAP keeps the pressure constant all night long.  · A bilevel device (BiPAP) provides more pressure when you breathe in and less when you breathe out. A BiPAP machine also may be set to provide automatic breaths to maintain breathing if you stop breathing while  sleeping.  · An autoCPAP device automatically adjusts pressure throughout the night and in response to changes such as body position, sleep stage, and snoring.  © 9574-2096 [employer]. [address] [place] [address]. All rights reserved. This information is not intended as a substitute for professional medical care. Always follow your healthcare professional's instructions.        Snoring and Sleep Apnea: Notes for a Partner  Snoring and sleep apnea affect your life, as well as your partners. You can help in the treatment of the problem. Be supportive. Encourage your partner both to get treatment and to make the adjustments needed to follow through.       Adjusting to Changes  Your partners treatment may involve making changes to certain life habits. You can help your partner make and stick with these changes. For example:  · Support and even join your partners exercise program.  · Be supportive if your partner gets CPAP (continuous positive airway pressure). He or she may feel self-conscious at first. Remind your partner to expect adjustments to CPAP before it feels just right.  · Consider joining a snoring and sleep apnea support group.  Go Along to See the Health Care Provider  You can give the health care provider the best account of your partners nighttime breathing and snoring patterns. Try to go along to health care providers appointments. If you cant go, write notes for your partner to give to the health care provider. Describe your partners snoring and sleep breathing patterns in detail.  Tips for Sleeping with a Snorer  Until treatment takes care of your partners snoring:  · Try to go to bed first. It may help if youre already asleep when your partner starts to snore.  · Wear earplugs to bed. A fan or other source of background noise may also help drown out snoring.   © 0949-5941 [employer]. [address] [place] [address]. All rights reserved.  This information is not intended as a substitute for professional medical care. Always follow your healthcare professional's instructions.        Continuous Positive Airway Pressure (CPAP)  Your health care provider has prescribed continuous positive airway pressure (CPAP) therapy for you. A CPAP device helps you breathe better at night. The device delivers air through your nose or mouth when you breathe in to keep your air passages open. CPAP is:  · Used most often to treat sleep apnea and some other problems (Sleep apnea is a chronic condition with periods of sleep in which you briefly stop breathing.)  · Safe and very effective, but it takes time to get used to the mask.   Your health care provider, nurse, or medical supplier will give you tips for wearing and caring for your CPAP device.  General guidelines  · It's very important not to give up! It takes time to get used to wearing the mask at night.  · Practice using your CPAP device during the day, especially whenever you take a nap.  · Remember, there are several different types of masks. If you cant get used to your mask, ask your provider or medical supply company about trying another style.  · If you have nasal stuffiness or dryness when using your CPAP device, talk with your provider or medical supply company. There are ways to lessen these problems. For example, your provider may recommend moistening nasal spray or the medical supply company may recommend a device with a humidifier.  · The goal is to use your CPAP all night, every night, during all naps, and even when you travel.  · Keep your mask clean. Wash it with soap and water. Be sure to rinse the mask and tubing well with water to remove any soap. Let them air-dry thoroughly before using.  · Make yourself comfortable when sleeping with CPAP. Try using extra pillows.  Work with your medical supply company so that you know how to correctly use your CPAP. Their representative will be able to help  you:  · Use the CPAP correctly  · Troubleshoot any problems that come up  · Learn to clean and maintain the device  · Adjust to regular use of the CPAP  © 9300-0504 [employer], [facility]. [address] [place] [address], [place] [address]. All rights reserved. This information is not intended as a substitute for professional medical care. Always follow your healthcare professional's instructions.

## 2020-09-09 NOTE — PROGRESS NOTES
Initial Outpatient Pulmonary Evaluation       SUBJECTIVE:     Chief Complaint   Patient presents with    Snoring       History of Present Illness:    Patient is a 50 y.o. female patient presenting for evaluation of snoring.    Complains of daytime somnolence nonrestorative sleep, has hypertension on 3 antihypertensives.    Never smoker.    Denies personal family history of asthma.    Was evaluated by Cardiology for chest pain.  EKG within normal.  Undergoing exercise stress test.    Does work at [employer].      STOP - BANG Questionnaire:     1. Snoring : Do you snore loudly ?    Yes    2. Tired : Do you often feel tired, fatigued, or sleepy during daytime? Yes    3. Observed: Has anyone observed you stop breathing during your sleep?   No     4. Blood pressure : Do you have or are you being treated for high blood pressure?   Yes    5. BMI :BMI more than 35 kg/m2?   No    6. Age : Age over 50 yr old?   No    7. Neck circumference:   For male, is your shirt collar 17 inches / 43cm or larger?  For female, is your shirt collar 16 inches / 41cm or larger?    No    8. Gender: Gender male?   No    STOP BANG SCORE 3    High risk of BENITO: Yes 5 - 8  Intermediate risk of BENITO: Yes 3 - 4  Low risk of BENITO: Yes 0 - 2      References:   STOP Questionnaire   A Tool to Screen Patients for Obstructive Sleep Apnea: SHAYY ThurmanR.C.P.C., Juan R West M.B.B.S., Randolph Jarquin M.D.,Heather Luna, Ph.D., PATRICK Jacobson.B.B.S.,_ Mychal Uribe.,_ Mychal Mcclure M.D., DONNA Gonzales.R.C.P.C.; Anesthesiology 2008; 108:812-21 Copyright © 2008, the American Society of Anesthesiologists, Inc. [employer]      Review of Systems   Constitutional: Positive for fatigue. Negative for fever and chills.   HENT: Negative for nosebleeds.    Eyes: Negative for redness.   Respiratory: Positive for snoring and somnolence. Negative for choking.     Cardiovascular: Positive for chest pain.   Genitourinary: Negative for hematuria.   Endocrine: Negative for cold intolerance.    Musculoskeletal: Positive for arthralgias.   Skin: Negative for rash.   Gastrointestinal: Negative for vomiting.   Neurological: Negative for syncope.   Hematological: Negative for adenopathy.   Psychiatric/Behavioral: Positive for sleep disturbance. Negative for confusion.       Review of patient's allergies indicates:   Allergen Reactions    Calcium Hives, Itching, Nausea And Vomiting and Swelling    Horse dander Hives, Itching, Rash, Shortness Of Breath and Swelling    Iron Hives, Itching and Nausea And Vomiting    Multivitamin Hives, Itching and Nausea And Vomiting    Vitamin Hives, Itching and Nausea And Vomiting       Current Outpatient Medications   Medication Sig Dispense Refill    amLODIPine (NORVASC) 5 MG tablet Take 1 tablet (5 mg total) by mouth once daily. 30 tablet 9    hydroCHLOROthiazide (HYDRODIURIL) 25 MG tablet Take 1 tablet (25 mg total) by mouth once daily. 30 tablet 6    losartan (COZAAR) 100 MG tablet Take 1 tablet (100 mg total) by mouth once daily. 90 tablet 3    naproxen sodium (ALEVE) 220 MG tablet Take 220 mg by mouth every other day.       No current facility-administered medications for this visit.        Past Medical History:   Diagnosis Date    Hypertension      Past Surgical History:   Procedure Laterality Date     SECTION      KNEE SURGERY Bilateral      Family History   Problem Relation Age of Onset    Hypertension Sister     Birth defects Maternal Grandfather     Heart attack Paternal Grandmother     Heart attack Paternal Grandfather     Breast cancer Maternal Aunt      Social History     Tobacco Use    Smoking status: Never Smoker    Smokeless tobacco: Never Used   Substance Use Topics    Alcohol use: Yes     Frequency: 2-3 times a week     Drinks per session: 1 or 2     Binge frequency: Never    Drug use: Not on file     "      OBJECTIVE:     Vital Signs (Most Recent)  Vital Signs  Pulse: 82  Resp: 16  SpO2: 98 %  BP: 126/82  Height and Weight  Height: 5' 7.25" (170.8 cm)  Weight: 81.9 kg (180 lb 8.9 oz)  BSA (Calculated - sq m): 1.97 sq meters  BMI (Calculated): 28.1  Weight in (lb) to have BMI = 25: 160.5]  Wt Readings from Last 2 Encounters:   09/09/20 81.9 kg (180 lb 8.9 oz)   09/09/20 76.7 kg (169 lb)         Physical Exam:  Physical Exam   Constitutional: She is oriented to person, place, and time. She appears well-developed and well-nourished.   HENT:   Head: Normocephalic.   Neck: Neck supple.   Cardiovascular: Normal rate, regular rhythm and normal heart sounds.   Pulmonary/Chest: Normal expansion, symmetric chest wall expansion, effort normal and breath sounds normal. No stridor. No respiratory distress. She exhibits no tenderness.   Abdominal: Soft.   Musculoskeletal:         General: No tenderness.   Lymphadenopathy:     She has no cervical adenopathy.   Neurological: She is alert and oriented to person, place, and time. Gait normal.   Skin: Skin is warm. No cyanosis. Nails show no clubbing.   Psychiatric: She has a normal mood and affect. Her behavior is normal. Judgment and thought content normal.   Nursing note and vitals reviewed.      Laboratory  No results found for: WBC, RBC, HGB, HCT, MCV, MCH, MCHC, RDW, PLT, MPV, GRAN, LYMPH, MONO, EOS, BASO, EOSINOPHIL, BASOPHIL    BMP  Lab Results   Component Value Date     05/19/2020    K 3.6 05/19/2020     05/19/2020    CO2 27 05/19/2020    BUN 19 05/19/2020    CREATININE 0.8 05/19/2020    CALCIUM 9.2 05/19/2020    ANIONGAP 9 05/19/2020    ESTGFRAFRICA >60.0 05/19/2020    EGFRNONAA >60.0 05/19/2020    AST 15 05/19/2020    ALT 16 05/19/2020    PROT 7.1 05/19/2020       No results found for: BNP    No results found for: TSH    No results found for: SEDRATE    No results found for: CRP    No results found for: IGE    No results found for: ASPERGILLUS  No results " found for: AFUMIGATUSCL     No results found for: ACE    Diagnostic Results:    I have personally reviewed today the following studies :    EKG 5/2020:      Normal sinus rhythm   Normal ECG   No previous ECGs available    ASSESSMENT/PLAN:     Suspected sleep apnea  -     Home Sleep Studies; Future    Snores  -     Ambulatory referral/consult to Sleep Disorders  -     Home Sleep Studies; Future    Non-restorative sleep  -     Home Sleep Studies; Future    Essential hypertension  -     Ambulatory referral/consult to Sleep Disorders    BMI 28.0-28.9,adult    Chest pain, unspecified type     Proceed with home sleep study.    CPAP recommendations to follow sleep study results.    Continue antihypertensives.    Chest pain awaiting cardiac stress test.  Cardiology follow-up      General weight loss/lifestyle modification strategies discussed (elicit support from others; identify saboteurs; non-food rewards).  Diet interventions: low calorie (1000 kCal/d) deficit diet      Follow up in about 2 months (around 11/9/2020).    This note was prepared using voice recognition system and is likely to have sound alike errors that may have been overlooked even after proof reading.  Please call me with any questions    Discussed diagnosis, its evaluation, treatment and usual course. All questions answered.    Thank you for the courtesy of participating in the care of this patient    Vandana Lea MD

## 2020-09-09 NOTE — PATIENT INSTRUCTIONS
Carpal Tunnel Syndrome Prevention Tips  Some repetitive hand activities put you at higher risk for carpal tunnel syndrome (CTS). But you can reduce your risk. Learn how to change the way you use your hands. Below are tips for at home and on the job. Be sure to also follow the hand and wrist safety policies at your workplace.      Keep your wrist in a neutral (straight) position when exercising.      Keep your wrist in neutral  Keep a neutral (straight) wrist position as often as you can. Dont use your wrist in a bent (flexed) position for long periods of time. This includes extended or twisted positions.  Watch your   Dont just use your thumb and index finger to grasp or lift. This can put stress on your wrist. When you can, use your whole hand and all its fingers to grasp an object.  Minimize repetition  Dont move your arms or hands or hold an object in the same way for long periods of time. Even simple, light tasks can cause injury this way. Instead, alternate tasks or switch hands.  Rest your hands  Give your hands a break from time to time with a rest. Even a few minutes once an hour can help.  Reduce speed and force  Slow down the speed in which you do a forceful, repetitive motion. This gives your wrist time to recover from the effort. Use power tools to help reduce the force.  Strengthen the muscles  Weak muscles may lead to a poor wrist or arm position. Exercises will make your hand and arm muscles stronger. This can help you keep a better position.  Date Last Reviewed: 9/11/2015 © 2000-2017 [employer]. [address] [place] [address], [place] [address]. All rights reserved. This information is not intended as a substitute for professional medical care. Always follow your healthcare professional's instructions.        Carpal Tunnel Syndrome    Carpal tunnel syndrome is a painful condition of the wrist and arm. It is caused by pressure on the median nerve.  The median nerve is one of the  nerves that give feeling and movement to the hand. It passes through a tunnel in the wrist called the carpal tunnel. This tunnel is made up of bones and ligaments. Narrowing of this tunnel or swelling of the tissues inside the tunnel puts pressure on the median nerve. This causes numbness, pins and needles, or electric shooting pains in your hand and forearm. Often the pain is worse at night and may wake you when you are asleep.  Carpal tunnel syndrome may occur during pregnancy and with use of birth control pills. It is more common in workers who must often bend their wrists. It is also common in people who work with power tools that cause strong vibrations.  Home care  · Rest the painful wrist. Avoid repeated bending of the wrist back and forth. This puts pressure on the median nerve. Avoid using power tools with strong vibrations.  · If you were given a splint, wear it at night while you sleep. You may also wear it during the day for comfort.  · Move your fingers and wrists often to avoid stiffness.  · Elevate your arms on pillows when you lie down.  · Try using the unaffected hand more.  · Try not to hold your wrists in a bent, downward position.  · Sometimes changes in the work place may ease symptoms. If you type most of the day, it may help to change the position of your keyboard or add a wrist support. Your wrist should be in a neutral position and not bent back when typing.  · You may use over-the-counter pain medicine to treat pain and inflammation, unless another medicine was prescribed. Anti-inflammatory pain medicines, such as ibuprofen or naproxen may be more effective than acetaminophen, which treats pain, but not inflammation. If you have chronic liver or kidney disease or ever had a stomach ulcer or GI bleeding, talk with your doctor before using these medicines.  · Opioid pain medicine will only give temporary relief and does not treat the problem. If pain continues, you may need a shot of a  steroid drug into your wrist.  · If the above methods fail, you may need surgery. This will open the carpal tunnel and release the pressure on the trapped nerve.  Follow-up care  Follow up with your healthcare provider, or as advised, if the pain doesnt begin to improve within the next week.  If X-rays were taken, you will be notified of any new findings that may affect your care.  When to seek medical advice  Call your healthcare provider right away if any of these occur:  · Pain not improving with the above treatment  · Fingers or hand become cold, blue, numb, or tingly  · Your whole arm becomes swollen or weak  Date Last Reviewed: 11/23/2015 © 2000-2017 [employer]. [address] [place] [address], [place] [address]. All rights reserved. This information is not intended as a substitute for professional medical care. Always follow your healthcare professional's instructions.        Understanding Carpal Tunnel Syndrome    The carpal tunnel is a narrow space inside the wrist. It is ringed by bone and a band of tough tissue called the transverse carpal ligament. A major nerve called the median nerve runs from the forearm into the hand through the carpal tunnel. Tendons also run through the carpal tunnel.  With carpal tunnel syndrome, the tendons or nearby tissues within the carpal tunnel may swell or thicken. Or the transverse carpal ligament may harden and shorten. This narrows the space in the carpal tunnel and puts pressure on the median nerve. This pressure leads to tingling and numbness of the hand and wrist. In time, the condition can make even simple tasks hard to do.  What causes carpal tunnel syndrome?  Doctors arent entirely clear why the condition occurs. Certain things may make a person more likely to have it. These include:  · Being female  · Being pregnant  · Being overweight  · Having diabetes or rheumatoid arthritis  Symptoms of carpal tunnel syndrome  Symptoms often come and go. At first,  symptoms may occur mainly at night. Later, they may be noticed during the day as well. They may get worse with activities such as driving, reading, typing, or holding a phone. Symptoms can include:  · Tingling and numbness in the hand or wrist  · Sharp pain that shoots up the arm or down to the fingers  · Hand stiffness or cramping, especially in the morning  · Trouble making a fist  · Hand weakness and clumsiness  Treatment for carpal tunnel syndrome  Certain treatments help reduce the pressure on the median nerve and relieve symptoms. Choices for treatment may include one or more of the following:  · Wrist splint. This involves wearing a special brace on the wrist and hand. The splint holds the wrist straight, in a neutral position. This helps keep the carpal tunnel as open as possible.  · Cortisone shots. Cortisone is a medicine that helps reduce swelling. It is injected directly into the wrist. It helps shrink tissues inside the carpal tunnel. This relieves symptoms for a time.  · Pain medicines. You may take over-the-counter or prescription medicines to help reduce swelling and relieve symptoms.  · Surgery. If the condition doesnt respond to other treatments and doesnt go away on its own, you may need surgery. During surgery, the surgeon cuts the transverse carpal ligament to relieve pressure on the median nerve.     When to call your healthcare provider  Call your healthcare provider right away if you have any of these:  · Fever of 100.4°F (38°C) or higher, or as directed  · Symptoms that dont get better, or get worse  · New symptoms   Date Last Reviewed: 3/10/2016  © 4257-2151 [employer], "[employer]. [address] [place] [address]. All rights reserved. This information is not intended as a substitute for professional medical care. Always follow your healthcare professional's instructions.        Carpal Tunnel Release Surgery  Surgery may be done if your carpal tunnel syndrome (CTS) symptoms  become severe. Or, you may have surgery if no other treatment brings relief. There are 2 types of CTS procedures. You will be told about the one you will have. Youll also be instructed how to prepare for it.      The goals of surgery  Two types of surgery--open and endoscopic--are used to treat CTS.  · With open surgery, your surgeon makes one incision in your palm. Standard surgical tools are used.  · With endoscopic surgery, one or two small incisions may be made in your hand. A scope (with a very small camera attached) and tools are inserted under the carpal ligament. The surgeon then operates while watching images on a video screen. No matter which one you have, the goal remains the same: Your surgeon will relieve pressure on the median nerve. To do this, the transverse carpal ligament is cut (released).  After surgery  If youve had carpal tunnel surgery, you will spend a few hours resting before you go home. The nerve sensation and circulation in your hand will be checked at this time. For the safest healing, keep the following in mind.  · Keep your hand raised above heart level. This will help reduce swelling.  · Limit hand and wrist use as instructed. A wrist brace may be required.  · Take any pain medication as directed.  · Do hand exercises as directed by your surgeon or therapist.  When to call the surgeon  Call your surgeon if you notice any of the following:  · White or pale-blue hand or nails (If you pinch your skin or nail and the color doesnt return)  · Pain that is not relieved by prescribed medicine  · Loss of sensation or excess swelling in hand or fingers  · Fever over 100.4°F (38°C)   Date Last Reviewed: 9/11/2015  © 1402-4143 [employer]. [address] [place] [address]. All rights reserved. This information is not intended as a substitute for professional medical care. Always follow your healthcare professional's instructions.

## 2020-09-09 NOTE — PROGRESS NOTES
[facility]   [address]  [place] [address]  Phone: [phone]        Full Name: pily aguilar YOB: 1970  Patient ID: [id number]      Visit Date: 9/9/2020 12:55  Age: 50 Years 5 Months Old  Examining Physician: Ct Du M.D.  Referring Physician: Dr Hurtado  Reason for Referral: uex pain    Chief Complaint   Patient presents with    Hand Pain     bilateral, numbness/tingling, weakness; left side is the worst       HPI: This is a 50 y.o.  female being seen in clinic today for evaluation of chronic hand numbness/tingling that has progressed over the past few months.  Initially her right hand was the worst, but now her left is becoming more painful.  She has increased pain at night when trying to sleep and has been dropping items from her hands.  She is wearing wrist braces with only minimal relief     History obtained from patient    Past family, medical, social, and surgical history reviewed in chart    Review of Systems:     General- denies lethargy, weight change, fever, chills  Head/neck- denies swallowing difficulties  ENT- denies hearing changes  Cardiovascular-denies chest pain  Pulmonary- denies shortness of breath  GI- denies constipation or bowel incontinence  - denies bladder incontinence  Skin- denies wounds or rashes  Musculoskeletal- +weakness, +pain  Neurologic- +numbness and tingling  Psychiatric- denies depressive or psychotic features, denies anxiety  Lymphatic-denies swelling  Endocrine- denies hypoglycemic symptoms/DM history  All other pertinent systems negative     Physical Examination:  General: Well developed, well nourished female, NAD  HEENT:NCAT EOMI bilaterally   Pulmonary:Normal respirations    Spinal Examination: CERVICAL  Active ROM is within normal limits.  Inspection: No deformity of spinal alignment.  Palpation: No vertebral tenderness to percussion.      Musculoskeletal Tests:  Phalen:   Elbow compression (ulnar):   Tinels  at wrist: +    Bilateral Upper and Lower Extremities:  Pulses are 2+ at radial bilaterally.  Shoulder/Elbow/Wrist/Hand ROM wnl  Hip/Knee/Ankle ROM   Bilateral Extremities show normal capillary refill.  No signs of cyanosis, rubor, edema, skin changes, or dysvascular changes of appendages.  Nails appear intact.    Neurological Exam:  Cranial Nerves:  II-XII grossly intact    Manual Muscle Testing: (Motor 5=normal)  5/5 strength bilateral upper extremities    No focal atrophy is noted of either upper extremity.    Bilateral Reflexes:  Pierce's response is absent bilaterally.    Sensation: tested to light touch  - intact in arms  Gait: Narrow base and good arm swing.      Entire procedure explained to patient prior to proceeding.  Verbal consent obtained      SNC      Nerve / Sites Rec. Site Onset Lat Peak Lat Amp Segments Distance Velocity     ms ms µV  mm m/s   L Median - Digit II (Antidromic)      Wrist Dig II 3.5 4.4 16.9 Wrist - Dig  40   R Median - Digit II (Antidromic)      Wrist Dig II 10.2 12.7 9.3 Wrist - Dig  14   L Ulnar - Digit V (Antidromic)      Wrist Dig V 2.4 3.2 27.7 Wrist - Dig V 140 57   R Ulnar - Digit V (Antidromic)      Wrist Dig V 2.7 3.5 26.0 Wrist - Dig V 140 53   L Radial - Anatomical snuff box (Forearm)      Forearm Wrist 1.3 2.3 11.5 Forearm - Wrist 100 77   R Radial - Anatomical snuff box (Forearm)      Forearm Wrist 1.9 2.3 13.0 Forearm - Wrist 100 53       MNC      Nerve / Sites Muscle Latency Amplitude Duration Rel Amp Segments Distance Lat Diff Velocity     ms mV ms %  mm ms m/s   L Median - APB      Wrist APB 4.5 5.9 7.0 100 Wrist - APB 80        Elbow APB 8.4 5.6 7.4 95.6 Elbow - Wrist 185 4.0 47   R Median - APB      Wrist APB 7.6 5.3 8.1 100 Wrist - APB 80        Elbow APB 12.0 5.2  97.6 Elbow - Wrist 210 4.4 47   L Ulnar - ADM      Wrist ADM 2.6 7.0 6.3 100 Wrist - ADM 80        B.Elbow ADM 5.7 7.0 6.8 99.5 B.Elbow - Wrist 210 3.1 67      A.Elbow ADM 7.4 6.9 7.1 98.7  A.Elbow - B.Elbow 110 1.7 64         A.Elbow - Wrist  4.8    R Ulnar - ADM      Wrist ADM 2.9 7.6 6.1 100 Wrist - ADM 80        B.Elbow ADM 5.9 7.3 6.7 96.4 B.Elbow - Wrist 210 3.0 70      A.Elbow ADM 7.7 7.2 7.3 99.1 A.Elbow - B.Elbow 120 1.8 68         A.Elbow - Wrist  4.8                                         INTERPRETATION  -Bilateral median motor nerve conduction study showed prolonged latency, normal amplitude, and dec conduction velocity  -Bilateral median sensory nerve conduction study showed prolonged peak latency and borderline dec amplitude on the right  -Bilateral ulnar motor nerve conduction study showed normal latency, amplitude, and conduction velocity  -Bilateral ulnar sensory nerve conduction study showed normal peak latency and amplitude  -Bilateral radial sensory nerve conduction study showed normal peak latency and amplitude      IMPRESSION  1. ABNORMAL study  2. 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 Follow up with referring provider: Dr. Ramona Hurtado  2. Handouts on CTS provided. Cont wrist braces. Appt scheduled with Dr Montenegro for further evaluation/tx  3. This study is good for one year. If symptoms worsen or do not improve, please re-consult.    Ct Du M.D.  Physical Medicine and Rehab

## 2020-09-09 NOTE — LETTER
September 9, 2020      Ramona Hurtado MD  [address] [address] [address]           [facility]  [address] [facility] [address]  Phone: [phone]  Fax: [phone]          Patient: Malinda Ruiz   MR Number: [id number]   YOB: 1970   Date of Visit: 9/9/2020       Dear Dr. Ramona Hurtado:    Thank you for referring Malinda Ruiz to me for evaluation. Attached you will find relevant portions of my assessment and plan of care.    If you have questions, please do not hesitate to call me. I look forward to following Malinda Ruiz along with you.    Sincerely,    Ct Du MD    Enclosure  CC:  No Recipients    If you would like to receive this communication electronically, please contact [email or web address] or [phone] to request more information on [employer] Link access.    For providers and/or their staff who would like to refer a patient to [facility], please contact us through our one-stop-shop provider referral line, [facility], at [phone].    If you feel you have received this communication in error or would no longer like to receive these types of communications, please e-mail [email or web address]

## 2020-09-09 NOTE — LETTER
September 9, 2020      Troy Velasquez MD  [address]  [place] [address]           [facility]  [address]  [place] [address]  Phone: [phone]  Fax: [phone]          Patient: Malinda Ruiz   MR Number: [id number]   YOB: 1970   Date of Visit: 9/9/2020       Dear Dr. Troy Velasquez:    Thank you for referring Malinda Ruiz to me for evaluation. Attached you will find relevant portions of my assessment and plan of care.    If you have questions, please do not hesitate to call me. I look forward to following Malinda Ruiz along with you.    Sincerely,    Vandana Lea MD    Enclosure  CC:  No Recipients    If you would like to receive this communication electronically, please contact [email or web address] or [phone] to request more information on [employer] Link access.    For providers and/or their staff who would like to refer a patient to [facility], please contact us through our one-stop-shop provider referral line, [facility], at [phone].    If you feel you have received this communication in error or would no longer like to receive these types of communications, please e-mail [email or web address]

## 2020-09-09 NOTE — LETTER
September 9, 2020      Ramona Hurtado MD  [address] [address] [address]           [facility]  [address] [place] [address]  Phone: [phone]  Fax: [phone]          Patient: Malinda Ruiz   MR Number: [id number]   YOB: 1970   Date of Visit: 9/9/2020       Dear Dr. Ramona Hurtado:    Thank you for referring Malinda Ruiz to me for evaluation. Attached you will find relevant portions of my assessment and plan of care.    If you have questions, please do not hesitate to call me. I look forward to following Malinda Ruiz along with you.    Sincerely,    Tsering Tapia MD    Enclosure  CC:  No Recipients    If you would like to receive this communication electronically, please contact [email or web address] or [phone] to request more information on [employer] Link access.    For providers and/or their staff who would like to refer a patient to [facility], please contact us through our one-stop-shop provider referral line, [facility], at [phone].    If you feel you have received this communication in error or would no longer like to receive these types of communications, please e-mail [email or web address]

## 2020-09-10 ENCOUNTER — TELEPHONE (OUTPATIENT)
Dept: PULMONOLOGY | Facility: HOSPITAL | Age: 50
End: 2020-09-10

## 2020-09-14 ENCOUNTER — HOSPITAL ENCOUNTER (OUTPATIENT)
Dept: CARDIOLOGY | Facility: HOSPITAL | Age: 50
Discharge: HOME | End: 2020-09-14
Attending: INTERNAL MEDICINE
Payer: COMMERCIAL

## 2020-09-14 DIAGNOSIS — R07.89 OTHER CHEST PAIN: ICD-10-CM

## 2020-09-14 LAB
CV STRESS BASE HR: 74 BPM
DIASTOLIC BLOOD PRESSURE: 91 MMHG
OHS CV CPX 1 MINUTE RECOVERY HEART RATE: 126 BPM
OHS CV CPX 85 PERCENT MAX PREDICTED HEART RATE MALE: 138
OHS CV CPX ESTIMATED METS: 7
OHS CV CPX MAX PREDICTED HEART RATE: 163
OHS CV CPX PATIENT IS FEMALE: 1
OHS CV CPX PATIENT IS MALE: 0
OHS CV CPX PEAK DIASTOLIC BLOOD PRESSURE: 98 MMHG
OHS CV CPX PEAK HEAR RATE: 141 BPM
OHS CV CPX PEAK RATE PRESSURE PRODUCT: NORMAL
OHS CV CPX PEAK SYSTOLIC BLOOD PRESSURE: 200 MMHG
OHS CV CPX PERCENT MAX PREDICTED HEART RATE ACHIEVED: 87
OHS CV CPX RATE PRESSURE PRODUCT PRESENTING: 9028
STRESS ECHO POST EXERCISE DUR MIN: 6 MINUTES
SYSTOLIC BLOOD PRESSURE: 122 MMHG

## 2020-09-14 PROCEDURE — 93016 CV STRESS TEST SUPVJ ONLY: CPT | Mod: ,,, | Performed by: INTERNAL MEDICINE

## 2020-09-14 PROCEDURE — 93017 CV STRESS TEST TRACING ONLY: CPT

## 2020-09-14 PROCEDURE — 93016 EXERCISE STRESS - EKG (CUPID ONLY): ICD-10-PCS | Mod: ,,, | Performed by: INTERNAL MEDICINE

## 2020-09-14 PROCEDURE — 93018 EXERCISE STRESS - EKG (CUPID ONLY): ICD-10-PCS | Mod: ,,, | Performed by: INTERNAL MEDICINE

## 2020-09-14 PROCEDURE — 93018 CV STRESS TEST I&R ONLY: CPT | Mod: ,,, | Performed by: INTERNAL MEDICINE

## 2020-09-15 ENCOUNTER — TELEPHONE (OUTPATIENT)
Dept: CARDIOLOGY | Facility: CLINIC | Age: 50
End: 2020-09-15

## 2020-09-15 DIAGNOSIS — I10 ESSENTIAL HYPERTENSION: ICD-10-CM

## 2020-09-15 RX ORDER — AMLODIPINE BESYLATE 10 MG/1
10 TABLET ORAL DAILY
Qty: 30 TABLET | Refills: 11 | Status: SHIPPED | OUTPATIENT
Start: 2020-09-15 | End: 2020-11-18 | Stop reason: SDUPTHER

## 2020-09-16 ENCOUNTER — TELEPHONE (OUTPATIENT)
Dept: CARDIOLOGY | Facility: CLINIC | Age: 50
End: 2020-09-16

## 2020-09-16 ENCOUNTER — PATIENT MESSAGE (OUTPATIENT)
Dept: CARDIOLOGY | Facility: CLINIC | Age: 50
End: 2020-09-16

## 2020-09-16 NOTE — TELEPHONE ENCOUNTER
The patient has been notified of this information and all questions answered. Stress test showed no ischemia   Had HTN during the study   Increase amlodipine to 10 mg daily   rtc in 2 months.   Pt is scheduled for two month follow up with Dr. Velasquez. Pt verbalizes understanding.

## 2020-09-16 NOTE — TELEPHONE ENCOUNTER
----- Message from Troy Velasquez MD sent at 9/15/2020  4:54 PM CDT -----  Stress test showed no ischemia  Had HTN during the study  Increase amlodipine to 10 mg daily  rtc in 2 months

## 2020-09-23 ENCOUNTER — OFFICE VISIT (OUTPATIENT)
Dept: ORTHOPEDICS | Facility: CLINIC | Age: 50
End: 2020-09-23
Payer: COMMERCIAL

## 2020-09-23 VITALS
DIASTOLIC BLOOD PRESSURE: 79 MMHG | BODY MASS INDEX: 28.25 KG/M2 | HEART RATE: 68 BPM | HEIGHT: 67 IN | SYSTOLIC BLOOD PRESSURE: 118 MMHG | WEIGHT: 180 LBS

## 2020-09-23 DIAGNOSIS — G56.03 CARPAL TUNNEL SYNDROME ON BOTH SIDES: Primary | ICD-10-CM

## 2020-09-23 PROCEDURE — 3074F PR MOST RECENT SYSTOLIC BLOOD PRESSURE < 130 MM HG: ICD-10-PCS | Mod: CPTII,S$GLB,, | Performed by: ORTHOPAEDIC SURGERY

## 2020-09-23 PROCEDURE — 3078F DIAST BP <80 MM HG: CPT | Mod: CPTII,S$GLB,, | Performed by: ORTHOPAEDIC SURGERY

## 2020-09-23 PROCEDURE — 3074F SYST BP LT 130 MM HG: CPT | Mod: CPTII,S$GLB,, | Performed by: ORTHOPAEDIC SURGERY

## 2020-09-23 PROCEDURE — 99999 PR PBB SHADOW E&M-EST. PATIENT-LVL III: CPT | Mod: PBBFAC,,, | Performed by: ORTHOPAEDIC SURGERY

## 2020-09-23 PROCEDURE — 99203 PR OFFICE/OUTPT VISIT, NEW, LEVL III, 30-44 MIN: ICD-10-PCS | Mod: 25,S$GLB,, | Performed by: ORTHOPAEDIC SURGERY

## 2020-09-23 PROCEDURE — 20526 CARPAL TUNNEL: L RADIOCARPAL: ICD-10-PCS | Mod: LT,S$GLB,, | Performed by: ORTHOPAEDIC SURGERY

## 2020-09-23 PROCEDURE — 20526 THER INJECTION CARP TUNNEL: CPT | Mod: LT,S$GLB,, | Performed by: ORTHOPAEDIC SURGERY

## 2020-09-23 PROCEDURE — 3078F PR MOST RECENT DIASTOLIC BLOOD PRESSURE < 80 MM HG: ICD-10-PCS | Mod: CPTII,S$GLB,, | Performed by: ORTHOPAEDIC SURGERY

## 2020-09-23 PROCEDURE — 3008F BODY MASS INDEX DOCD: CPT | Mod: CPTII,S$GLB,, | Performed by: ORTHOPAEDIC SURGERY

## 2020-09-23 PROCEDURE — 99999 PR PBB SHADOW E&M-EST. PATIENT-LVL III: ICD-10-PCS | Mod: PBBFAC,,, | Performed by: ORTHOPAEDIC SURGERY

## 2020-09-23 PROCEDURE — 99203 OFFICE O/P NEW LOW 30 MIN: CPT | Mod: 25,S$GLB,, | Performed by: ORTHOPAEDIC SURGERY

## 2020-09-23 PROCEDURE — 3008F PR BODY MASS INDEX (BMI) DOCUMENTED: ICD-10-PCS | Mod: CPTII,S$GLB,, | Performed by: ORTHOPAEDIC SURGERY

## 2020-09-23 RX ORDER — TRIAMCINOLONE ACETONIDE 40 MG/ML
40 INJECTION, SUSPENSION INTRA-ARTICULAR; INTRAMUSCULAR
Status: DISCONTINUED | OUTPATIENT
Start: 2020-09-23 | End: 2020-09-23 | Stop reason: HOSPADM

## 2020-09-23 RX ADMIN — TRIAMCINOLONE ACETONIDE 40 MG: 40 INJECTION, SUSPENSION INTRA-ARTICULAR; INTRAMUSCULAR at 02:09

## 2020-09-23 NOTE — LETTER
September 23, 2020      Ct Du MD  [address]  [place] [address]           [facility]  [address] [place] [address]  Phone: [phone]  Fax: [phone]          Patient: Malinda Flower   MR Number: [id number]   YOB: 1970   Date of Visit: 9/23/2020       Dear Dr. Ct Du:    Thank you for referring Malinda Flower to me for evaluation. Attached you will find relevant portions of my assessment and plan of care.    If you have questions, please do not hesitate to call me. I look forward to following Malinda Flower along with you.    Sincerely,    Trae Motnenegro MD    Enclosure  CC:  No Recipients    If you would like to receive this communication electronically, please contact [email or web address] or [phone] to request more information on [employer] Link access.    For providers and/or their staff who would like to refer a patient to [facility], please contact us through our one-stop-shop provider referral line, [facility], at [phone].    If you feel you have received this communication in error or would no longer like to receive these types of communications, please e-mail [email or web address]

## 2020-09-23 NOTE — PROCEDURES
Carpal Tunnel: L radiocarpal    Date/Time: 9/23/2020 2:20 PM  Performed by: Trae Montenegro MD  Authorized by: Trae Montenegro MD     Consent Done?:  Yes (Verbal)  Indications:  Pain  Timeout: prior to procedure the correct patient, procedure, and site was verified    Prep: patient was prepped and draped in usual sterile fashion      Local anesthesia used?: Yes    Local anesthetic:  Lidocaine 2% without epinephrine  Anesthetic total (ml):  1    Location:  Wrist  Site:  L radiocarpal  Ultrasonic Guidance for Needle Placement?: No    Needle size:  25 G  Approach:  Volar  Medications:  40 mg triamcinolone acetonide 40 mg/mL

## 2020-09-23 NOTE — PROGRESS NOTES
Subjective:     Patient ID: Malinda Flower is a 50 y.o. female.    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  She has bilateral carpal tunnel syndrome documented by nerve conduction studies left greater than right.  She wishes to avoid surgery at this point.  Nerve conduction studies confirmed bilateral carpal tunnel syndrome      Past Medical History:   Diagnosis Date    Hypertension      Past Surgical History:   Procedure Laterality Date     SECTION      KNEE SURGERY Bilateral      Family History   Problem Relation Age of Onset    Hypertension Sister     Birth defects Maternal Grandfather     Heart attack Paternal Grandmother     Heart attack Paternal Grandfather     Breast cancer Maternal Aunt      Social History     Socioeconomic History    Marital status: Single     Spouse name: Not on file    Number of children: Not on file    Years of education: Not on file    Highest education level: Not on file   Occupational History    Not on file   Social Needs    Financial resource strain: Hard    Food insecurity     Worry: Sometimes true     Inability: Never true    Transportation needs     Medical: No     Non-medical: No   Tobacco Use    Smoking status: Never Smoker    Smokeless tobacco: Never Used   Substance and Sexual Activity    Alcohol use: Yes     Frequency: 2-3 times a week     Drinks per session: 1 or 2     Binge frequency: Never    Drug use: Not on file    Sexual activity: Not on file   Lifestyle    Physical activity     Days per week: 6 days     Minutes per session: 80 min    Stress: To some extent   Relationships    Social connections     Talks on phone: Three times a week     Gets together: Never     Attends [employer] service: Not on file     Active member of club or organization: No     Attends meetings of clubs or organizations:  Never     Relationship status: Living with partner   Other Topics Concern    Not on file   Social History Narrative    Not on file     Medication List with Changes/Refills   Current Medications    AMLODIPINE (NORVASC) 10 MG TABLET    Take 1 tablet (10 mg total) by mouth once daily.    HYDROCHLOROTHIAZIDE (HYDRODIURIL) 25 MG TABLET    Take 1 tablet (25 mg total) by mouth once daily.    LOSARTAN (COZAAR) 100 MG TABLET    Take 1 tablet (100 mg total) by mouth once daily.    NAPROXEN SODIUM (ALEVE) 220 MG TABLET    Take 220 mg by mouth every other day.     Review of patient's allergies indicates:   Allergen Reactions    Calcium Hives, Itching, Nausea And Vomiting and Swelling    Horse dander Hives, Itching, Rash, Shortness Of Breath and Swelling    Iron Hives, Itching and Nausea And Vomiting    Multivitamin Hives, Itching and Nausea And Vomiting    Vitamin Hives, Itching and Nausea And Vomiting     Review of Systems   Constitution: Negative for malaise/fatigue.   HENT: Negative for hearing loss.    Eyes: Negative for double vision and visual disturbance.   Cardiovascular: Positive for chest pain.   Respiratory: Positive for shortness of breath.    Endocrine: Negative for cold intolerance.   Hematologic/Lymphatic: Does not bruise/bleed easily.   Skin: Negative for poor wound healing and suspicious lesions.   Musculoskeletal: Negative for gout, joint pain and joint swelling.   Gastrointestinal: Negative for nausea and vomiting.   Genitourinary: Negative for dysuria.   Neurological: Positive for headaches, numbness, paresthesias and sensory change.   Psychiatric/Behavioral: Negative for depression, memory loss and substance abuse. The patient is not nervous/anxious.    Allergic/Immunologic: Negative for persistent infections.       Objective:   Body mass index is 27.98 kg/m².  Vitals:    09/23/20 1402   BP: 118/79   Pulse: 68                General    Constitutional: She is oriented to person, place, and time. She  appears well-developed and well-nourished. No distress.   HENT:   Head: Normocephalic.   Eyes: EOM are normal.   Neck: Normal range of motion.   Pulmonary/Chest: Effort normal.   Neurological: She is oriented to person, place, and time.   Psychiatric: She has a normal mood and affect.             Right Hand/Wrist Exam     Inspection   Scars: Wrist - absent Hand -  absent  Effusion: Wrist - absent Hand -  absent    Pain   Wrist - The patient exhibits pain of the flexor/pronator group.    Tests   Phalens sign: positive  Tinel's sign (median nerve): positive  Carpal Tunnel Compression Test: positive    Atrophy   Thenar:  negative  Intrinsic:  negative    Other     Neuorologic Exam    Median Distribution: abnormal  Ulnar Distribution: normal  Radial Distribution: normal    Comments:  The patient has a positive Tinel and positive Phalen sign.  There is no thenar atrophy noted.      Left Hand/Wrist Exam     Inspection   Scars: Wrist - absent Hand -  absent  Effusion: Wrist - absent Hand -  absent    Pain   Wrist - The patient exhibits pain of the flexor/pronator group.    Tests   Phalens sign: positive  Tinel's sign (median nerve): positive  Carpal Tunnel Compression Test: positive    Atrophy  Thenar:  Negative  Intrinsic: negative    Other     Sensory Exam  Median Distribution: abnormal  Ulnar Distribution: normal  Radial Distribution: normal    Comments:  The patient has a positive Tinel and positive Phalen sign.  There is no thenar atrophy evident.          Vascular Exam       Capillary Refill  Right Hand: normal capillary refill  Left Hand: normal capillary refill      Relevant imaging results reviewed and interpreted by me, discussed with the patient and / or family today radiographs were not obtained today  Assessment:     Encounter Diagnosis   Name Primary?    Carpal tunnel syndrome on both sides Yes        Plan:     The patient was injected left wrist carpal tunnel with 1 cc of Kenalog and 1 cc of 2% plain  lidocaine.  She has wrist splints at home.  She will return in 3 months for reinjection if needed                Disclaimer: This note was prepared using a voice recognition system and is likely to have sound alike errors within the text.

## 2020-10-02 PROCEDURE — 95806 SLEEP STUDY UNATT&RESP EFFT: CPT | Mod: 26,52,S$GLB, | Performed by: INTERNAL MEDICINE

## 2020-10-02 PROCEDURE — 95806 PR SLEEP STUDY, UNATTENDED, SIMUL RECORD HR/O2 SAT/RESP FLOW/RESP EFFT: ICD-10-PCS | Mod: 26,52,S$GLB, | Performed by: INTERNAL MEDICINE

## 2020-10-05 ENCOUNTER — PROCEDURE VISIT (OUTPATIENT)
Dept: SLEEP MEDICINE | Facility: CLINIC | Age: 50
End: 2020-10-05
Payer: COMMERCIAL

## 2020-10-05 DIAGNOSIS — G47.33 OSA (OBSTRUCTIVE SLEEP APNEA): Primary | ICD-10-CM

## 2020-10-05 DIAGNOSIS — R29.818 SUSPECTED SLEEP APNEA: ICD-10-CM

## 2020-10-05 DIAGNOSIS — R06.83 SNORES: ICD-10-CM

## 2020-10-05 DIAGNOSIS — G47.8 NON-RESTORATIVE SLEEP: ICD-10-CM

## 2020-10-05 NOTE — Clinical Note
1 night study  MILD/BORDERLINE OBSTRUCTIVE SLEEP APNEA with overall AHI 10.8/hr ( 30 events): night #1:  Based on 4% rule.  Oxygen desaturation: 92%. SpO2 between 90% to 94% for 9 min.  Patient snored 99% time above 50 .  Heart rate range: 52 bpm - 72 bpm  REC's:  Therapy with APAP at 4-20 cm WP using mask of choice with heated humidification is an option

## 2020-10-06 DIAGNOSIS — G47.33 MILD OBSTRUCTIVE SLEEP APNEA: Primary | ICD-10-CM

## 2020-10-06 NOTE — PROCEDURES
Home Sleep Studies    Date/Time: 10/5/2020 8:00 AM  Performed by: Gokul Gurrola MD  Authorized by: Vandana Lea MD       1 night study  MILD/BORDERLINE OBSTRUCTIVE SLEEP APNEA with overall AHI 10.8/hr ( 30 events): night #1:  Based on 4% rule.  Oxygen desaturation: 92%. SpO2 between 90% to 94% for 9 min.  Patient snored 99% time above 50 .  Heart rate range: 52 bpm - 72 bpm  REC's:  Therapy with APAP at 4-20 cm WP using mask of choice with heated humidification is an option

## 2020-10-07 ENCOUNTER — PATIENT MESSAGE (OUTPATIENT)
Dept: PULMONOLOGY | Facility: CLINIC | Age: 50
End: 2020-10-07

## 2020-11-18 ENCOUNTER — OFFICE VISIT (OUTPATIENT)
Dept: INTERNAL MEDICINE | Facility: CLINIC | Age: 50
End: 2020-11-18
Payer: COMMERCIAL

## 2020-11-18 VITALS
HEIGHT: 67 IN | OXYGEN SATURATION: 99 % | WEIGHT: 171.31 LBS | SYSTOLIC BLOOD PRESSURE: 126 MMHG | BODY MASS INDEX: 26.89 KG/M2 | HEART RATE: 73 BPM | DIASTOLIC BLOOD PRESSURE: 70 MMHG | TEMPERATURE: 99 F

## 2020-11-18 DIAGNOSIS — I10 ESSENTIAL HYPERTENSION: ICD-10-CM

## 2020-11-18 PROBLEM — Z11.4 ENCOUNTER FOR SCREENING FOR HIV: Status: RESOLVED | Noted: 2020-05-19 | Resolved: 2020-11-18

## 2020-11-18 PROBLEM — I16.0 HYPERTENSIVE URGENCY: Status: RESOLVED | Noted: 2020-04-24 | Resolved: 2020-11-18

## 2020-11-18 PROCEDURE — 99999 PR PBB SHADOW E&M-EST. PATIENT-LVL III: ICD-10-PCS | Mod: PBBFAC,,, | Performed by: NURSE PRACTITIONER

## 2020-11-18 PROCEDURE — 3074F SYST BP LT 130 MM HG: CPT | Mod: CPTII,S$GLB,, | Performed by: NURSE PRACTITIONER

## 2020-11-18 PROCEDURE — 3078F DIAST BP <80 MM HG: CPT | Mod: CPTII,S$GLB,, | Performed by: NURSE PRACTITIONER

## 2020-11-18 PROCEDURE — 99999 PR PBB SHADOW E&M-EST. PATIENT-LVL III: CPT | Mod: PBBFAC,,, | Performed by: NURSE PRACTITIONER

## 2020-11-18 PROCEDURE — 3074F PR MOST RECENT SYSTOLIC BLOOD PRESSURE < 130 MM HG: ICD-10-PCS | Mod: CPTII,S$GLB,, | Performed by: NURSE PRACTITIONER

## 2020-11-18 PROCEDURE — 1126F AMNT PAIN NOTED NONE PRSNT: CPT | Mod: S$GLB,,, | Performed by: NURSE PRACTITIONER

## 2020-11-18 PROCEDURE — 1126F PR PAIN SEVERITY QUANTIFIED, NO PAIN PRESENT: ICD-10-PCS | Mod: S$GLB,,, | Performed by: NURSE PRACTITIONER

## 2020-11-18 PROCEDURE — 3008F BODY MASS INDEX DOCD: CPT | Mod: CPTII,S$GLB,, | Performed by: NURSE PRACTITIONER

## 2020-11-18 PROCEDURE — 3008F PR BODY MASS INDEX (BMI) DOCUMENTED: ICD-10-PCS | Mod: CPTII,S$GLB,, | Performed by: NURSE PRACTITIONER

## 2020-11-18 PROCEDURE — 3078F PR MOST RECENT DIASTOLIC BLOOD PRESSURE < 80 MM HG: ICD-10-PCS | Mod: CPTII,S$GLB,, | Performed by: NURSE PRACTITIONER

## 2020-11-18 PROCEDURE — 99214 OFFICE O/P EST MOD 30 MIN: CPT | Mod: S$GLB,,, | Performed by: NURSE PRACTITIONER

## 2020-11-18 PROCEDURE — 99214 PR OFFICE/OUTPT VISIT, EST, LEVL IV, 30-39 MIN: ICD-10-PCS | Mod: S$GLB,,, | Performed by: NURSE PRACTITIONER

## 2020-11-18 RX ORDER — AMLODIPINE BESYLATE 10 MG/1
10 TABLET ORAL DAILY
Qty: 90 TABLET | Refills: 3 | Status: SHIPPED | OUTPATIENT
Start: 2020-11-18 | End: 2021-11-18

## 2020-11-18 RX ORDER — HYDROCHLOROTHIAZIDE 25 MG/1
25 TABLET ORAL DAILY
Qty: 90 TABLET | Refills: 3 | Status: SHIPPED | OUTPATIENT
Start: 2020-11-18 | End: 2021-11-18

## 2020-11-18 RX ORDER — LOSARTAN POTASSIUM 100 MG/1
100 TABLET ORAL DAILY
Qty: 90 TABLET | Refills: 3 | Status: SHIPPED | OUTPATIENT
Start: 2020-11-18 | End: 2021-11-18

## 2020-11-18 NOTE — PROGRESS NOTES
Subjective:       Patient ID: Malinda Flower is a 50 y.o. female.    Chief Complaint: Hypertension and Medication Refill    Mrs Ramos presents to clinic for medication refill. She has no acute complaints.     Hypertension  This is a chronic problem. The current episode started more than 1 year ago. The problem is controlled. Associated symptoms include headaches. Pertinent negatives include no anxiety, blurred vision, chest pain, malaise/fatigue, neck pain, palpitations, peripheral edema or shortness of breath. There are no associated agents to hypertension. Past treatments include calcium channel blockers, diuretics and angiotensin blockers. The current treatment provides significant improvement. There are no compliance problems.  There is no history of angina, kidney disease, CAD/MI, CVA, heart failure, left ventricular hypertrophy, PVD or retinopathy.       Patient Active Problem List   Diagnosis    Carpal tunnel syndrome on both sides    Essential hypertension    Breast cancer screening    Chest discomfort    Tension headache    Other chest pain    SOB (shortness of breath)       Family History   Problem Relation Age of Onset    Hypertension Sister     Birth defects Maternal Grandfather     Heart attack Paternal Grandmother     Heart attack Paternal Grandfather     Breast cancer Maternal Aunt      Past Surgical History:   Procedure Laterality Date     SECTION      KNEE SURGERY Bilateral          Current Outpatient Medications:     amLODIPine (NORVASC) 10 MG tablet, Take 1 tablet (10 mg total) by mouth once daily., Disp: 90 tablet, Rfl: 3    hydroCHLOROthiazide (HYDRODIURIL) 25 MG tablet, Take 1 tablet (25 mg total) by mouth once daily., Disp: 90 tablet, Rfl: 3    losartan (COZAAR) 100 MG tablet, Take 1 tablet (100 mg total) by mouth once daily., Disp: 90 tablet, Rfl: 3    naproxen sodium (ALEVE) 220 MG tablet, Take 220 mg by mouth every other day., Disp: , Rfl:  "    Review of Systems   Constitutional: Negative for chills, fatigue, fever and malaise/fatigue.   Eyes: Negative for blurred vision.   Respiratory: Negative for cough and shortness of breath.    Cardiovascular: Negative for chest pain, palpitations and leg swelling.   Gastrointestinal: Negative for nausea and vomiting.   Musculoskeletal: Negative for back pain, gait problem and neck pain.   Neurological: Positive for headaches. Negative for dizziness, tremors, syncope and light-headedness.       Objective:   /70   Pulse 73   Temp 98.8 °F (37.1 °C) (Temporal)   Ht 5' 7.25" (1.708 m)   Wt 77.7 kg (171 lb 4.8 oz)   LMP 11/18/2020   SpO2 99%   BMI 26.63 kg/m²      Physical Exam  Constitutional:       General: She is not in acute distress.     Appearance: Normal appearance. She is not ill-appearing.   HENT:      Head: Normocephalic and atraumatic.   Neck:      Musculoskeletal: Normal range of motion and neck supple.   Cardiovascular:      Rate and Rhythm: Normal rate and regular rhythm.      Pulses: Normal pulses.      Heart sounds: Normal heart sounds. No murmur. No friction rub. No gallop.    Pulmonary:      Effort: Pulmonary effort is normal. No respiratory distress.      Breath sounds: Normal breath sounds.   Musculoskeletal: Normal range of motion.      Right lower leg: No edema.      Left lower leg: No edema.   Skin:     General: Skin is warm and dry.      Coloration: Skin is not pale.      Findings: No erythema.   Neurological:      General: No focal deficit present.      Mental Status: She is alert and oriented to person, place, and time.         Assessment & Plan     Problem List Items Addressed This Visit        Cardiac/Vascular    Essential hypertension    Current Assessment & Plan     Blood pressure well controlled. Continue current medications.          Relevant Medications    losartan (COZAAR) 100 MG tablet    hydroCHLOROthiazide (HYDRODIURIL) 25 MG tablet    amLODIPine (NORVASC) 10 MG tablet " "       Follow up in about 6 months (around 5/18/2021).            Portions of this note may have been created with voice recognition software. Occasional "wrong-word" or "sound-a-like" substitutions may have occurred due to the inherent limitations of voice recognition software. Please, read the note carefully and recognize, using context, where substitutions have occurred.       "

## 2021-10-04 ENCOUNTER — PATIENT MESSAGE (OUTPATIENT)
Dept: ADMINISTRATIVE | Facility: HOSPITAL | Age: 51
End: 2021-10-04

## 2021-10-05 ENCOUNTER — PATIENT OUTREACH (OUTPATIENT)
Dept: ADMINISTRATIVE | Facility: HOSPITAL | Age: 51
End: 2021-10-05

## 2023-07-07 ENCOUNTER — PATIENT MESSAGE (OUTPATIENT)
Dept: INFECTIOUS DISEASES | Facility: CLINIC | Age: 53
End: 2023-07-07
Payer: COMMERCIAL